# Patient Record
Sex: MALE | Race: WHITE | NOT HISPANIC OR LATINO | Employment: FULL TIME | URBAN - METROPOLITAN AREA
[De-identification: names, ages, dates, MRNs, and addresses within clinical notes are randomized per-mention and may not be internally consistent; named-entity substitution may affect disease eponyms.]

---

## 2021-01-03 ENCOUNTER — NURSE TRIAGE (OUTPATIENT)
Dept: OTHER | Facility: OTHER | Age: 41
End: 2021-01-03

## 2021-01-03 ENCOUNTER — TELEPHONE (OUTPATIENT)
Dept: OTHER | Facility: OTHER | Age: 41
End: 2021-01-03

## 2021-01-03 NOTE — TELEPHONE ENCOUNTER
Regarding: Brianberg of breath and No taste    ----- Message from Charissa Cochran sent at 1/3/2021  4:26 PM EST -----  "My girlfriend tested positive   And I am having  Headache, no taste, really tired, Shortness of breat

## 2021-01-03 NOTE — TELEPHONE ENCOUNTER
Reason for Disposition   SEVERE or constant chest pain or pressure (Exception: mild central chest pain, present only when coughing)    Additional Information   [1] Symptoms of COVID-19 (e g , cough, fever, SOB, or others) AND [2] within 14 days of EXPOSURE (close contact) with diagnosed or suspected COVID-19 patient    Answer Assessment - Initial Assessment Questions  1  Were you within 6 feet or less, for up to 15 minutes or more with a person that has a confirmed COVID-19 test?      [place]  2  What was the date of your exposure? Live together  3  Are you experiencing any symptoms attributed to the virus?  (Assess for SOB, cough, fever, difficulty breathing)    Headache, loss of taste, fatigue, sore throat, Denies fever  Feeling slightly feverish  Pt states he is having pain in his chest and SOB  Pt has history of COPD but states that the SOB feels more significant now  4  HIGH RISK: Do you have any history heart or lung conditions, weakened immune system, diabetes, Asthma, CHF, HIV, COPD, Chemo, renal failure, sickle cell, etc?     COPD    Protocols used: CORONAVIRUS (COVID-19) DIAGNOSED OR SUSPECTED-ADULT-AH, CORONAVIRUS (COVID-19) EXPOSURE-ADULT-AH    Pt stated that he would try to find a ride to the [facility]  Could not give a specific time frame on when he could go

## 2022-05-01 ENCOUNTER — HOSPITAL ENCOUNTER (EMERGENCY)
Facility: HOSPITAL | Age: 42
Discharge: HOME | End: 2022-05-01
Attending: STUDENT IN AN ORGANIZED HEALTH CARE EDUCATION/TRAINING PROGRAM
Payer: OTHER MISCELLANEOUS

## 2022-05-01 ENCOUNTER — APPOINTMENT (EMERGENCY)
Dept: RADIOLOGY | Facility: HOSPITAL | Age: 42
End: 2022-05-01
Payer: OTHER MISCELLANEOUS

## 2022-05-01 VITALS
TEMPERATURE: 98.1 F | WEIGHT: 273.59 LBS | BODY MASS INDEX: 35.11 KG/M2 | DIASTOLIC BLOOD PRESSURE: 80 MMHG | RESPIRATION RATE: 18 BRPM | SYSTOLIC BLOOD PRESSURE: 139 MMHG | HEART RATE: 77 BPM | HEIGHT: 74 IN | OXYGEN SATURATION: 98 %

## 2022-05-01 DIAGNOSIS — S61.319A LACERATION OF NAIL BED OF FINGER, INITIAL ENCOUNTER: ICD-10-CM

## 2022-05-01 DIAGNOSIS — S61.309A AVULSION OF FINGERNAIL, INITIAL ENCOUNTER: ICD-10-CM

## 2022-05-01 DIAGNOSIS — S62.609B OPEN FRACTURE OF FINGER: ICD-10-CM

## 2022-05-01 DIAGNOSIS — S67.10XA CRUSHING INJURY OF FINGER, INITIAL ENCOUNTER: Primary | ICD-10-CM

## 2022-05-01 DIAGNOSIS — S61.316A LACERATION OF RIGHT LITTLE FINGER WITHOUT FOREIGN BODY WITH DAMAGE TO NAIL, INITIAL ENCOUNTER: ICD-10-CM

## 2022-05-01 PROCEDURE — 99285 EMERGENCY DEPT VISIT HI MDM: CPT

## 2022-05-01 PROCEDURE — 99283 EMERGENCY DEPT VISIT LOW MDM: CPT | Performed by: STUDENT IN AN ORGANIZED HEALTH CARE EDUCATION/TRAINING PROGRAM

## 2022-05-01 PROCEDURE — 73140 X-RAY EXAM OF FINGER(S): CPT

## 2022-05-01 PROCEDURE — 96365 THER/PROPH/DIAG IV INF INIT: CPT

## 2022-05-01 PROCEDURE — 12001 RPR S/N/AX/GEN/TRNK 2.5CM/<: CPT | Performed by: STUDENT IN AN ORGANIZED HEALTH CARE EDUCATION/TRAINING PROGRAM

## 2022-05-01 RX ORDER — CEPHALEXIN 500 MG/1
500 CAPSULE ORAL EVERY 6 HOURS SCHEDULED
Qty: 27 CAPSULE | Refills: 0 | Status: SHIPPED | OUTPATIENT
Start: 2022-05-01 | End: 2022-05-08

## 2022-05-01 RX ORDER — OXYCODONE HYDROCHLORIDE 5 MG/1
5 TABLET ORAL EVERY 6 HOURS PRN
Qty: 8 TABLET | Refills: 0 | Status: SHIPPED | OUTPATIENT
Start: 2022-05-01

## 2022-05-01 RX ORDER — CEFAZOLIN SODIUM 2 G/50ML
2000 SOLUTION INTRAVENOUS ONCE
Status: COMPLETED | OUTPATIENT
Start: 2022-05-01 | End: 2022-05-01

## 2022-05-01 RX ORDER — BACITRACIN, NEOMYCIN, POLYMYXIN B 400; 3.5; 5 [USP'U]/G; MG/G; [USP'U]/G
1 OINTMENT TOPICAL ONCE
Status: COMPLETED | OUTPATIENT
Start: 2022-05-01 | End: 2022-05-01

## 2022-05-01 RX ORDER — BUPIVACAINE HYDROCHLORIDE 2.5 MG/ML
15 INJECTION, SOLUTION EPIDURAL; INFILTRATION; INTRACAUDAL ONCE
Status: COMPLETED | OUTPATIENT
Start: 2022-05-01 | End: 2022-05-01

## 2022-05-01 RX ADMIN — BACITRACIN, NEOMYCIN, POLYMYXIN B 1 SMALL APPLICATION: 400; 3.5; 5 OINTMENT TOPICAL at 04:57

## 2022-05-01 RX ADMIN — BUPIVACAINE HYDROCHLORIDE 15 ML: 2.5 INJECTION, SOLUTION EPIDURAL; INFILTRATION; INTRACAUDAL at 00:59

## 2022-05-01 RX ADMIN — CEFAZOLIN SODIUM 2000 MG: 2 SOLUTION INTRAVENOUS at 03:18

## 2022-05-01 NOTE — ED PROVIDER NOTES
History  Chief Complaint   Patient presents with    Crush Injury     pinky finger right hand crushed between metal       History provided by:  Patient  Finger Laceration  Location:  Finger  Finger laceration location:  R little finger  Depth: Through underlying tissue  Quality: avulsion    Bleeding: controlled    Time since incident:  15 minutes  Laceration mechanism:  Blunt object  Pain details:     Quality:  Throbbing    Severity:  Severe    Timing:  Constant  Relieved by:  None tried  Ineffective treatments:  None tried  Tetanus status:  Up to date     46year old M  Presents to the ED after a crush injury to his right fifth digit  He was at work when his right fifth digit was crushed between two large pieces of metal  The injury occurred immediately prior to arrival  Bleeding controlled  Tetanus booster is UTD  The patient expresses severe pain in his right hand  Past Medical History:   Diagnosis Date    COPD (chronic obstructive pulmonary disease) ([facility] [address] )        History reviewed  No pertinent surgical history  History reviewed  No pertinent family history  I have reviewed and agree with the history as documented  E-Cigarette/Vaping     E-Cigarette/Vaping Substances     Social History     Tobacco Use    Smoking status: Heavy Tobacco Smoker     Packs/day: 1 00    Smokeless tobacco: Never Used   Substance Use Topics    Alcohol use: Yes    Drug use: Never       Review of Systems   Musculoskeletal: Positive for arthralgias and joint swelling  Skin: Positive for color change and wound  Hematological: Does not bruise/bleed easily  All other systems reviewed and are negative  Physical Exam  Physical Exam  Musculoskeletal:        Hands:       Comments: Right 5th digit nail avulsion with associated nail bed injury  The nail bed was macerated  There were also two lacerations (1 5 cm and 1 0 cm) along the radial and volar aspects of the fifth digit  The fifth digit was otherwise NVI  Vital Signs  ED Triage Vitals [05/01/22 0032]   Temperature Pulse Respirations Blood Pressure SpO2   98 1 °F (36 7 °C) 83 16 (!) 179/93 96 %      Temp Source Heart Rate Source Patient Position - Orthostatic VS BP Location FiO2 (%)   Temporal Monitor Sitting Right arm --      Pain Score       8         Vitals:    05/01/22 0032 05/01/22 0500   BP: (!) 179/93 139/80   Pulse: 83 77   Patient Position - Orthostatic VS: Sitting Sitting     ED Medications  Medications   bupivacaine (PF) (MARCAINE) 0 25 % injection 15 mL (15 mL Infiltration Given 5/1/22 0059)   ceFAZolin (ANCEF) IVPB (premix in dextrose) 2,000 mg 50 mL (0 mg Intravenous Stopped 5/1/22 5587)   neomycin-bacitracin-polymyxin b (NEOSPORIN) ointment 1 small application (1 small application Topical Given 5/1/22 7330)     Diagnostic Studies  Results Reviewed     None             XR finger fifth digit-pinkie RIGHT   ED Interpretation by Anthony Marrero DO (05/01 2212)   Fifth distal phalanx fractures             Procedures  Laceration repair    Date/Time: 5/1/2022 3:15 AM  Performed by: Anthony Marrero DO  Authorized by: Anthony Marrero DO   Consent: Verbal consent obtained  Consent given by: patient  Body area: upper extremity  Location details: right small finger  Tendon involvement: none  Nerve involvement: none  Anesthesia: digital block    Anesthesia:  Local Anesthetic: bupivacaine 0 5% without epinephrine    Sedation:  Patient sedated: no      Wound Dehiscence:  Superficial Wound Dehiscence: simple closure  Secondary closure or dehiscence: complex    Procedure Details:  Irrigation solution: saline  Irrigation method: syringe  Amount of cleaning: extensive  Debridement: none  Degree of undermining: none  Skin closure: Ethilon (three sutures using 6-0 gut were used to bring together the macerated parts of the nail bed, while 5 sutures were used to keep the replaced nail in place   An additional )  Number of sutures: 16  Technique: simple  Approximation: loose  Approximation difficulty: complex  Dressing: 4x4 sterile gauze and antibiotic ointment  Patient tolerance: patient tolerated the procedure well with no immediate complications  Comments: 2 sutures using 6-0 gut were used to bring together the macerated parts of the nail bed, while 5 sutures (5-0 ethilon) were used to keep the replaced nail in place  An additional 9 sutures were used to repair the lacerations along the radial and volar aspects of the fifth digit  Splint application    Date/Time: 5/1/2022 4:00 AM  Performed by: Madelyn Vargas DO  Authorized by: Madelyn Vargas DO   Universal Protocol:  Consent given by: patient  Patient understanding: patient states understanding of the procedure being performed  Site marked: the operative site was marked  Patient identity confirmed: verbally with patient      Pre-procedure details:     Sensation:  Normal    Skin color:  Pink  Procedure details:     Laterality:  Right    Location:  Finger    Finger:  R small finger    Splint type:  Finger splint, static  Post-procedure details:     Pain:  Unchanged    Sensation:  Normal    Skin color:  Pink    Patient tolerance of procedure: Tolerated well, no immediate complications      ED Course     SBIRT 20yo+      Most Recent Value   SBIRT (22 yo +)    In order to provide better care to our patients, we are screening all of our patients for alcohol and drug use  Would it be okay to ask you these screening questions? No Filed at: 05/01/2022 0031      MDM     72-year-old male  Presents to the emergency department after a crushing injury to his right 5th finger  Upon examination, the patient has an avulsed fingernail with a macerated nail bed along with a laceration along the radial aspect of the 5th digit and along the volar aspect  A digital block was performed followed by extensive irrigation of the wounds  XRs sig for a distal phalanx fractures   Due to severity of the injury, the case was discussed with ED physician (Dr Luan Weldon) at [facility] for possible transfer for orthopedic evaluation and treatment  Dr Luan Weldon  was in contact with senior orthopedic resident (Dr Eli Maharaj) who advised that the  nail bed and other lacerations can be repaired in the ED with urgent Ortho referral  The patient's tetanus booster is UTD  The patient was administered a dose of Ancef prior to suturing  See procedural note above for further details  The patient tolerated the repair and splinting well  The patient was prescribed a course Keflex and PRN Oxycodone  Motrin/Tylenol recommended for pain  The patient's name and  were sent to the Orthopedic resident to arrange prompt follow up  Return precautions were discussed with the patient  All questions were addressed  The patient was stable for discharge       Disposition  Final diagnoses:   Crushing injury of finger, initial encounter   Open fracture of finger   Avulsion of fingernail, initial encounter   Laceration of nail bed of finger, initial encounter   Laceration of right little finger without foreign body with damage to nail, initial encounter     Time reflects when diagnosis was documented in both MDM as applicable and the Disposition within this note     Time User Action Codes Description Comment    2022  4:36 AM Mallissa Manner Add [S67 [id number]] Crushing injury of finger, initial encounter     2022  4:37 AM Mallissa Manner Add [S62 [id number]] Open fracture of finger     2022  4:37 AM Mallissa Manner Add [S61 309A] Avulsion of fingernail, initial encounter     2022  4:37 AM Mallissa Manner Add [S61 319A] Laceration of nail bed of finger, initial encounter     2022  4:37 AM Mallissa Manner Add [S61 316A] Laceration of right little finger without foreign body with damage to nail, initial encounter       ED Disposition     ED Disposition Condition Date/Time Comment    Discharge Stable Sun May 1, 2022  4:36 AM Merle Jose discharge to home/self care             Follow-up Information    None         Discharge Medication List as of 5/1/2022  4:41 AM      START taking these medications    Details   cephalexin (KEFLEX) 500 mg capsule Take 1 capsule (500 mg total) by mouth every 6 (six) hours for 7 days, Starting Sun 5/1/2022, Until Sun 5/8/2022, Normal      oxyCODONE (Roxicodone) 5 immediate release tablet Take 1 tablet (5 mg total) by mouth every 6 (six) hours as needed for severe pain Max Daily Amount: 20 mg, Starting Sun 5/1/2022, Normal             No discharge procedures on file      PDMP Review     None          ED Provider  Electronically Signed by           Benna Dakins, DO  05/01/22 4459

## 2022-05-01 NOTE — DISCHARGE INSTRUCTIONS
You were evaluated in the emergency department after a crushing injury to your finger  You have a fracture along the finger tip  Multiple sutures were used to repair the nail bed and surrounding tissue  For pain, you can take Motrin 600 mg every 6 hours and Tylenol 1000 mg every 6 hours  You are also being provided with a course of oxycodone as needed for pain  An urgent referral to Hand surgery at [facility] was provided  Expect a phone call within the next few days to set up the appointment  Do not hesitate to be re-evaluated in the ED for any concerning signs or symptoms

## 2023-04-04 ENCOUNTER — OFFICE VISIT (OUTPATIENT)
Dept: URGENT CARE | Facility: CLINIC | Age: 43
End: 2023-04-04

## 2023-04-04 VITALS
TEMPERATURE: 98.4 F | RESPIRATION RATE: 18 BRPM | BODY MASS INDEX: 30.16 KG/M2 | OXYGEN SATURATION: 97 % | SYSTOLIC BLOOD PRESSURE: 142 MMHG | HEIGHT: 74 IN | DIASTOLIC BLOOD PRESSURE: 84 MMHG | HEART RATE: 96 BPM | WEIGHT: 235 LBS

## 2023-04-04 DIAGNOSIS — J22 LOWER RESPIRATORY INFECTION: Primary | ICD-10-CM

## 2023-04-04 DIAGNOSIS — J02.9 SORE THROAT: ICD-10-CM

## 2023-04-04 LAB
S PYO AG THROAT QL: NEGATIVE
SARS-COV-2 AG UPPER RESP QL IA: NEGATIVE
VALID CONTROL: NORMAL

## 2023-04-04 RX ORDER — LANSOPRAZOLE 30 MG/1
30 CAPSULE, DELAYED RELEASE ORAL DAILY
COMMUNITY
Start: 2023-02-23

## 2023-04-04 RX ORDER — AMOXICILLIN 500 MG/1
500 TABLET, FILM COATED ORAL 2 TIMES DAILY
Qty: 20 TABLET | Refills: 0 | Status: SHIPPED | OUTPATIENT
Start: 2023-04-04 | End: 2023-04-14

## 2023-04-04 RX ORDER — FLUTICASONE PROPIONATE 50 MCG
SPRAY, SUSPENSION (ML) NASAL
COMMUNITY
Start: 2023-02-02

## 2023-04-04 RX ORDER — ALBUTEROL SULFATE 90 UG/1
2 AEROSOL, METERED RESPIRATORY (INHALATION) EVERY 6 HOURS PRN
Qty: 8.5 G | Refills: 0 | Status: SHIPPED | OUTPATIENT
Start: 2023-04-04

## 2023-04-04 RX ORDER — BENZONATATE 200 MG/1
200 CAPSULE ORAL 3 TIMES DAILY PRN
Qty: 20 CAPSULE | Refills: 0 | Status: SHIPPED | OUTPATIENT
Start: 2023-04-04

## 2023-04-04 NOTE — PROGRESS NOTES
[facility]        NAME: Rubens Hayes is a 38 y o  male  : 1980    MRN: [id number]  DATE: 2023  TIME: 1:06 PM    Assessment and Plan   Lower respiratory infection [J22]  1  Lower respiratory infection  Poct Covid 19 Rapid Antigen Test    POCT rapid strepA    amoxicillin (AMOXIL) 500 MG tablet    benzonatate (TESSALON) 200 MG capsule    albuterol (ProAir HFA) 90 mcg/act inhaler      2  Sore throat          Discussed problem with patient and his wife  Rapid strep and COVID performed today were both negative  Prescribing amoxicillin for lower respiratory infection as well as Tessalon Perles for cough complaints as well as albuterol inhaler as needed for shortness of breath and wheezing symptoms  Should continue to push fluids and recommended Mucinex and Flonase to help with nasal congestion  Should follow-up with PCP if symptoms not improve and report to the ER symptoms worsen  Patient Instructions       Follow up with PCP in 3-5 days  Proceed to  ER if symptoms worsen  Chief Complaint     Chief Complaint   Patient presents with   • Cold Like Symptoms     C/o no appetite, nasal congestion, headaches, wheezing, fatigue cough with brown mucus  Onset of symptoms x4 days  History of Present Illness       43-year-old male presents with 4 days of sore throat, cough, nasal congestion  Patient's son tested positive for strep last week  Symptoms started gradually and denies any fevers or chills but does report fatigue  Appetite is decreased and has not been pushing fluids  Does report cough and wheezing but states that could be due to postnasal drip  Reports episodes of diarrhea as well as headaches  Review of Systems   Review of Systems   Constitutional: Positive for appetite change and fatigue  Negative for chills and fever (tylenol or ibuprofen)  HENT: Positive for congestion, postnasal drip, rhinorrhea and sore throat (6/10)   Negative for ear pain, sinus pressure and sinus pain  Eyes: Negative for photophobia and visual disturbance  Respiratory: Positive for cough and wheezing  Negative for shortness of breath and stridor  Cardiovascular: Negative for chest pain and palpitations  Gastrointestinal: Positive for diarrhea  Negative for abdominal pain, constipation, nausea and vomiting  Musculoskeletal: Negative for myalgias  Neurological: Positive for headaches  Negative for dizziness, syncope and light-headedness           Current Medications       Current Outpatient Medications:   •  albuterol (ProAir HFA) 90 mcg/act inhaler, Inhale 2 puffs every 6 (six) hours as needed for wheezing, Disp: 8 5 g, Rfl: 0  •  amoxicillin (AMOXIL) 500 MG tablet, Take 1 tablet (500 mg total) by mouth 2 (two) times a day for 10 days, Disp: 20 tablet, Rfl: 0  •  benzonatate (TESSALON) 200 MG capsule, Take 1 capsule (200 mg total) by mouth 3 (three) times a day as needed for cough, Disp: 20 capsule, Rfl: 0  •  fluticasone (FLONASE) 50 mcg/act nasal spray, SPRAY 2 SPRAYS INTO EACH NOSTRIL EVERY DAY, Disp: , Rfl:   •  lansoprazole (PREVACID) 30 mg capsule, Take 30 mg by mouth daily, Disp: , Rfl:   •  oxyCODONE (Roxicodone) 5 immediate release tablet, Take 1 tablet (5 mg total) by mouth every 6 (six) hours as needed for severe pain Max Daily Amount: 20 mg, Disp: 8 tablet, Rfl: 0    Current Allergies     Allergies as of 04/04/2023   • (No Known Allergies)            The following portions of the patient's history were reviewed and updated as appropriate: allergies, current medications, past family history, past medical history, past social history, past surgical history and problem list      Past Medical History:   Diagnosis Date   • COPD (chronic obstructive pulmonary disease) ([facility] [address] )        Past Surgical History:   Procedure Laterality Date   • FINGER SURGERY Right     Pinky       Family History   Problem Relation Age of Onset   • Heart disease Father          Medications have been "verified  Objective   /84   Pulse 96   Temp 98 4 °F (36 9 °C)   Resp 18   Ht 6' 2\" (1 88 m)   Wt 107 kg (235 lb)   SpO2 97%   BMI 30 17 kg/m²        Physical Exam     Physical Exam  Vitals and nursing note reviewed  Constitutional:       General: He is not in acute distress  Appearance: Normal appearance  He is normal weight  He is not ill-appearing, toxic-appearing or diaphoretic  Comments: Patient is sitting comfortably on exam table  Diaphoresis noted on exam   HENT:      Head: Normocephalic  Right Ear: Tympanic membrane, ear canal and external ear normal  There is no impacted cerumen  Left Ear: Tympanic membrane, ear canal and external ear normal  There is no impacted cerumen  Nose: Congestion present  Mouth/Throat:      Mouth: Mucous membranes are moist       Pharynx: Oropharynx is clear  No oropharyngeal exudate or posterior oropharyngeal erythema  Eyes:      General:         Right eye: No discharge  Left eye: No discharge  Extraocular Movements: Extraocular movements intact  Conjunctiva/sclera: Conjunctivae normal       Pupils: Pupils are equal, round, and reactive to light  Neck:      Vascular: No carotid bruit  Cardiovascular:      Rate and Rhythm: Normal rate and regular rhythm  Pulses: Normal pulses  Heart sounds: Normal heart sounds  No murmur heard  No friction rub  No gallop  Pulmonary:      Effort: No respiratory distress  Breath sounds: Normal breath sounds  No stridor  No wheezing, rhonchi or rales  Chest:      Chest wall: No tenderness  Musculoskeletal:         General: No swelling, tenderness or signs of injury  Normal range of motion  Cervical back: Normal range of motion and neck supple  No rigidity or tenderness  Lymphadenopathy:      Cervical: No cervical adenopathy  Skin:     General: Skin is warm and dry  Capillary Refill: Capillary refill takes less than 2 seconds        " Coloration: Skin is not jaundiced or pale  Findings: No erythema  Neurological:      General: No focal deficit present  Mental Status: He is alert and oriented to person, place, and time     Psychiatric:         Mood and Affect: Mood normal          Behavior: Behavior normal

## 2023-04-25 ENCOUNTER — APPOINTMENT (EMERGENCY)
Dept: CT IMAGING | Facility: HOSPITAL | Age: 43
End: 2023-04-25

## 2023-04-25 ENCOUNTER — HOSPITAL ENCOUNTER (EMERGENCY)
Facility: HOSPITAL | Age: 43
Discharge: HOME | End: 2023-04-25
Attending: EMERGENCY MEDICINE

## 2023-04-25 VITALS
DIASTOLIC BLOOD PRESSURE: 82 MMHG | WEIGHT: 246.91 LBS | SYSTOLIC BLOOD PRESSURE: 128 MMHG | RESPIRATION RATE: 17 BRPM | OXYGEN SATURATION: 96 % | TEMPERATURE: 98.1 F | HEART RATE: 91 BPM | BODY MASS INDEX: 31.7 KG/M2

## 2023-04-25 DIAGNOSIS — S09.90XA INJURY OF HEAD, INITIAL ENCOUNTER: ICD-10-CM

## 2023-04-25 DIAGNOSIS — S16.1XXA STRAIN OF NECK MUSCLE, INITIAL ENCOUNTER: ICD-10-CM
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
## 2023-04-25 LAB
ALBUMIN SERPL BCP-MCNC: 3.5 G/DL (ref 3.5–5)
ALP SERPL-CCNC: 79 U/L (ref 34–104)
ALT SERPL W P-5'-P-CCNC: 29 U/L (ref 7–52)
ANION GAP SERPL CALCULATED.3IONS-SCNC: 6 MMOL/L (ref 4–13)
AST SERPL W P-5'-P-CCNC: 27 U/L (ref 13–39)
BASOPHILS # BLD AUTO: 0.04 THOUSANDS/ΜL (ref 0–0.1)
BASOPHILS NFR BLD AUTO: 0 % (ref 0–1)
BILIRUB SERPL-MCNC: 0.67 MG/DL (ref 0.2–1)
BUN SERPL-MCNC: 8 MG/DL (ref 5–25)
CALCIUM SERPL-MCNC: 8.5 MG/DL (ref 8.4–10.2)
CHLORIDE SERPL-SCNC: 108 MMOL/L (ref 96–108)
CO2 SERPL-SCNC: 23 MMOL/L (ref 21–32)
CREAT SERPL-MCNC: 1.29 MG/DL (ref 0.6–1.3)
EOSINOPHIL # BLD AUTO: 0.26 THOUSAND/ΜL (ref 0–0.61)
EOSINOPHIL NFR BLD AUTO: 3 % (ref 0–6)
ERYTHROCYTE [DISTWIDTH] IN BLOOD BY AUTOMATED COUNT: 16 % (ref 11.6–15.1)
GFR SERPL CREATININE-BSD FRML MDRD: 68 ML/MIN/1.73SQ M
GLUCOSE SERPL-MCNC: 96 MG/DL (ref 65–140)
HCT VFR BLD AUTO: 50.1 % (ref 36.5–49.3)
HGB BLD-MCNC: 16.5 G/DL (ref 12–17)
IMM GRANULOCYTES # BLD AUTO: 0.02 THOUSAND/UL (ref 0–0.2)
IMM GRANULOCYTES NFR BLD AUTO: 0 % (ref 0–2)
LYMPHOCYTES # BLD AUTO: 1.44 THOUSANDS/ΜL (ref 0.6–4.47)
LYMPHOCYTES NFR BLD AUTO: 14 % (ref 14–44)
MCH RBC QN AUTO: 30.1 PG (ref 26.8–34.3)
MCHC RBC AUTO-ENTMCNC: 32.9 G/DL (ref 31.4–37.4)
MCV RBC AUTO: 91 FL (ref 82–98)
MONOCYTES # BLD AUTO: 0.72 THOUSAND/ΜL (ref 0.17–1.22)
MONOCYTES NFR BLD AUTO: 7 % (ref 4–12)
NEUTROPHILS # BLD AUTO: 7.58 THOUSANDS/ΜL (ref 1.85–7.62)
NEUTS SEG NFR BLD AUTO: 76 % (ref 43–75)
NRBC BLD AUTO-RTO: 0 /100 WBCS
PLATELET # BLD AUTO: 312 THOUSANDS/UL (ref 149–390)
PMV BLD AUTO: 10.6 FL (ref 8.9–12.7)
POTASSIUM SERPL-SCNC: 4 MMOL/L (ref 3.5–5.3)
PROT SERPL-MCNC: 6.3 G/DL (ref 6.4–8.4)
RBC # BLD AUTO: 5.49 MILLION/UL (ref 3.88–5.62)
SODIUM SERPL-SCNC: 137 MMOL/L (ref 135–147)
WBC # BLD AUTO: 10.06 THOUSAND/UL (ref 4.31–10.16)

## 2023-04-25 RX ORDER — NAPROXEN 500 MG/1
500 TABLET ORAL 2 TIMES DAILY PRN
Qty: 20 TABLET | Refills: 0 | Status: SHIPPED | OUTPATIENT
Start: 2023-04-25

## 2023-04-25 RX ORDER — OXYCODONE HYDROCHLORIDE AND ACETAMINOPHEN 5; 325 MG/1; MG/1
1 TABLET ORAL ONCE
Status: COMPLETED | OUTPATIENT
Start: 2023-04-25 | End: 2023-04-25

## 2023-04-25 RX ORDER — CLONIDINE HYDROCHLORIDE 0.1 MG/1
0.1 TABLET ORAL ONCE
Status: COMPLETED | OUTPATIENT
Start: 2023-04-25 | End: 2023-04-25

## 2023-04-25 RX ORDER — METHOCARBAMOL 500 MG/1
500 TABLET, FILM COATED ORAL 2 TIMES DAILY PRN
Qty: 20 TABLET | Refills: 0 | Status: SHIPPED | OUTPATIENT
Start: 2023-04-25

## 2023-04-25 RX ADMIN — CLONIDINE HYDROCHLORIDE 0.1 MG: 0.1 TABLET ORAL at 09:50

## 2023-04-25 RX ADMIN — OXYCODONE HYDROCHLORIDE AND ACETAMINOPHEN 1 TABLET: 5; 325 TABLET ORAL at 09:50

## 2023-04-25 NOTE — Clinical Note
Rigoberto Fire was seen and treated in our emergency department on 4/25/2023  Diagnosis:     Jeana Srivastava  may return to work on return date  He may return on this date: 04/27/2023         If you have any questions or concerns, please don't hesitate to call        Janet Sanchez MD    ______________________________           _______________          _______________  Hospital Representative                              Date                                Time
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
## 2023-05-30 ENCOUNTER — HOSPITAL ENCOUNTER (OUTPATIENT)
Dept: MRI IMAGING | Facility: HOSPITAL | Age: 43
Discharge: HOME | End: 2023-05-30

## 2023-05-30 DIAGNOSIS — S06.9X0S UNSPECIFIED INTRACRANIAL INJURY WITHOUT LOSS OF CONSCIOUSNESS, SEQUELA (HCC): ICD-10-CM

## 2023-05-30 DIAGNOSIS — R20.2 PARESTHESIA OF SKIN: ICD-10-CM

## 2023-07-05 ENCOUNTER — OFFICE VISIT (OUTPATIENT)
Dept: URGENT CARE | Facility: CLINIC | Age: 43
End: 2023-07-05
Payer: COMMERCIAL

## 2023-07-05 ENCOUNTER — HOSPITAL ENCOUNTER (OUTPATIENT)
Dept: RADIOLOGY | Facility: CLINIC | Age: 43
Discharge: HOME | End: 2023-07-05
Payer: COMMERCIAL

## 2023-07-05 VITALS
DIASTOLIC BLOOD PRESSURE: 88 MMHG | RESPIRATION RATE: 18 BRPM | HEART RATE: 96 BPM | BODY MASS INDEX: 28.25 KG/M2 | WEIGHT: 220 LBS | TEMPERATURE: 97.3 F | OXYGEN SATURATION: 99 % | SYSTOLIC BLOOD PRESSURE: 126 MMHG

## 2023-07-05 DIAGNOSIS — S69.91XA INJURY OF RIGHT WRIST, INITIAL ENCOUNTER: ICD-10-CM

## 2023-07-05 DIAGNOSIS — S63.501A SPRAIN OF RIGHT WRIST, INITIAL ENCOUNTER: Primary | ICD-10-CM

## 2023-07-05 PROCEDURE — 99213 OFFICE O/P EST LOW 20 MIN: CPT | Performed by: PHYSICIAN ASSISTANT

## 2023-07-05 PROCEDURE — 29125 APPL SHORT ARM SPLINT STATIC: CPT | Performed by: PHYSICIAN ASSISTANT

## 2023-07-05 PROCEDURE — 73110 X-RAY EXAM OF WRIST: CPT

## 2023-07-05 NOTE — LETTER
July 5, 2023     Patient: Maryjo Branham   YOB: 1980   Date of Visit: 7/5/2023       To Whom It May Concern:    Please excuse Maryjo Branham from work until 7/10/2023 provided symptoms have improved. He may return sooner if symptoms resolve. If you have any questions or concerns, please don't hesitate to call.          Sincerely,        Kisha Mcdonough PA-C    CC: No Recipients

## 2023-07-05 NOTE — PATIENT INSTRUCTIONS
Tylenol/Ibuprofen for pain  Wear brace for support (Remove braces every 3 hours)  Ice 20 minutes 3-4 times per day for 3 days  Insulate the skin from the ice to prevent frostbite  Rest and Elevate  Follow up with orthopedic if symptoms do not improve  Follow up with PCP in 3-5 days. Proceed to  ER if symptoms worsen. Remove splint/brace and go straight to ER if you experience sudden increase in pain, swelling, change in color/temperature/sensation, chest pain, shortness or breath, or if you start coughing up blood.

## 2023-07-05 NOTE — PROGRESS NOTES
[facility]        NAME: Lita Christina is a 38 y.o. male  : 1980    MRN: [id number]  DATE: 2023  TIME: 4:04 PM    Assessment and Plan   Sprain of right wrist, initial encounter [S63.501A]  1. Sprain of right wrist, initial encounter  XR wrist 3+ vw right    Ambulatory referral to Orthopedic Surgery    Orthopedic injury treatment        XR provider read: no acute fracture or dislocation. Patient declined ortho apt but states he would like the phone number. Patient Instructions     Tylenol/Ibuprofen for pain  Wear brace for support (Remove braces every 3 hours)  Ice 20 minutes 3-4 times per day for 3 days  Insulate the skin from the ice to prevent frostbite  Rest and Elevate  Follow up with orthopedic if symptoms do not improve  Follow up with PCP in 3-5 days. Proceed to  ER if symptoms worsen. Remove splint/brace and go straight to ER if you experience sudden increase in pain, swelling, change in color/temperature/sensation, chest pain, shortness or breath, or if you start coughing up blood. Chief Complaint     Chief Complaint   Patient presents with   • Wrist Pain     C/o right wrist pain after pt felt a "snap" while playing with his kids 2 days ago         History of Present Illness       Patient states he had a bone graft in the same radial area years ago. Wrist Pain   Pain location: R wrist. This is a new problem. Episode onset: 2 days. History of extremity trauma: lifting son onto second bunk when he twisted his R wrist and felt a pop. The problem has been gradually worsening. The quality of the pain is described as aching. The pain is moderate. Pertinent negatives include no limited range of motion, numbness or tingling. He has tried acetaminophen and NSAIDS (icey/hot) for the symptoms. Review of Systems   Review of Systems   Musculoskeletal: Negative for joint swelling. Skin: Negative for color change. Neurological: Positive for weakness.  Negative for tingling and numbness. Current Medications       Current Outpatient Medications:   •  fluticasone (FLONASE) 50 mcg/act nasal spray, SPRAY 2 SPRAYS INTO EACH NOSTRIL EVERY DAY, Disp: , Rfl:   •  lansoprazole (PREVACID) 30 mg capsule, Take 30 mg by mouth daily, Disp: , Rfl:   •  albuterol (ProAir HFA) 90 mcg/act inhaler, Inhale 2 puffs every 6 (six) hours as needed for wheezing (Patient not taking: Reported on 7/5/2023), Disp: 8.5 g, Rfl: 0  •  benzonatate (TESSALON) 200 MG capsule, Take 1 capsule (200 mg total) by mouth 3 (three) times a day as needed for cough (Patient not taking: Reported on 4/25/2023), Disp: 20 capsule, Rfl: 0  •  methocarbamol (ROBAXIN) 500 mg tablet, Take 1 tablet (500 mg total) by mouth 2 (two) times a day as needed for muscle spasms, Disp: 20 tablet, Rfl: 0  •  naproxen (Naprosyn) 500 mg tablet, Take 1 tablet (500 mg total) by mouth 2 (two) times a day as needed for moderate pain for up to 20 doses, Disp: 20 tablet, Rfl: 0  •  oxyCODONE (Roxicodone) 5 immediate release tablet, Take 1 tablet (5 mg total) by mouth every 6 (six) hours as needed for severe pain Max Daily Amount: 20 mg, Disp: 8 tablet, Rfl: 0    Current Allergies     Allergies as of 07/05/2023   • (No Known Allergies)            The following portions of the patient's history were reviewed and updated as appropriate: allergies, current medications, past family history, past medical history, past social history, past surgical history and problem list.     Past Medical History:   Diagnosis Date   • COPD (chronic obstructive pulmonary disease) ([address])        Past Surgical History:   Procedure Laterality Date   • FINGER SURGERY Right     Pinky       Family History   Problem Relation Age of Onset   • Heart disease Father          Medications have been verified. Objective   /88   Pulse 96   Temp (!) 97.3 °F (36.3 °C)   Resp 18   Wt 99.8 kg (220 lb)   SpO2 99%   BMI 28.25 kg/m²   No LMP for male patient. Physical Exam     Physical Exam  Vitals reviewed. Constitutional:       General: He is not in acute distress. Appearance: He is well-developed. HENT:      Head: Normocephalic and atraumatic. Cardiovascular:      Rate and Rhythm: Normal rate and regular rhythm. Pulses: Normal pulses. Heart sounds: Normal heart sounds. No murmur heard. No friction rub. No gallop. Pulmonary:      Effort: Pulmonary effort is normal. No respiratory distress. Breath sounds: Normal breath sounds. No wheezing, rhonchi or rales. Musculoskeletal:         General: Tenderness (R distal radius and ulna) present. No swelling or deformity. Normal range of motion. Comments: Pain with R wrist flexion, extension, ulnar and radial deviation   Skin:     General: Skin is warm. Capillary Refill: Capillary refill takes less than 2 seconds. Findings: No bruising or erythema. Neurological:      General: No focal deficit present. Mental Status: He is alert and oriented to person, place, and time. Sensory: No sensory deficit. Psychiatric:         Behavior: Behavior normal.         Thought Content: Thought content normal.         Judgment: Judgment normal.       Orthopedic injury treatment    Date/Time: 7/5/2023 3:30 PM    Performed by: Regina Rowley PA-C  Authorized by: Regina Rowley PA-C  Universal Protocol:  Consent: Verbal consent obtained.   Risks and benefits: risks, benefits and alternatives were discussed  Consent given by: patient  Patient identity confirmed: verbally with patient      Injury location: R wrist.  Neurovascular status: Neurovascularly intact    Distal perfusion: normal    Neurological function: normal    Range of motion: normal    Immobilization: static wrist brace (DME)  Neurovascular status: Neurovascularly intact    Distal perfusion: normal    Neurological function: normal    Range of motion: unchanged    Patient tolerance:  Patient tolerated the procedure well with no immediate complications

## 2023-07-06 ENCOUNTER — TELEPHONE (OUTPATIENT)
Dept: OBGYN CLINIC | Facility: OTHER | Age: 43
End: 2023-07-06

## 2023-07-06 NOTE — TELEPHONE ENCOUNTER
Patient is being referred to a orthopedics. Please schedule accordingly.     [address] [facility]   [phone]

## 2024-06-04 ENCOUNTER — APPOINTMENT (EMERGENCY)
Dept: RADIOLOGY | Facility: HOSPITAL | Age: 44
End: 2024-06-04
Payer: COMMERCIAL

## 2024-06-04 ENCOUNTER — HOSPITAL ENCOUNTER (OUTPATIENT)
Facility: HOSPITAL | Age: 44
Setting detail: OBSERVATION
Discharge: HOME | End: 2024-06-05
Attending: EMERGENCY MEDICINE | Admitting: FAMILY MEDICINE
Payer: COMMERCIAL

## 2024-06-04 DIAGNOSIS — I31.9 PERICARDITIS: ICD-10-CM

## 2024-06-04 DIAGNOSIS — R94.31 ABNORMAL EKG: ICD-10-CM

## 2024-06-04 DIAGNOSIS — N17.9 AKI (ACUTE KIDNEY INJURY) (HCC): ICD-10-CM

## 2024-06-04 DIAGNOSIS — R07.9 CHEST PAIN: Primary | ICD-10-CM

## 2024-06-04 DIAGNOSIS — I10 HTN (HYPERTENSION): ICD-10-CM

## 2024-06-04 PROBLEM — J30.2 SEASONAL ALLERGIES: Status: ACTIVE | Noted: 2024-06-04

## 2024-06-04 PROBLEM — K21.9 GASTROESOPHAGEAL REFLUX DISEASE WITHOUT ESOPHAGITIS: Status: ACTIVE | Noted: 2024-06-04

## 2024-06-04 PROBLEM — R07.89 OTHER CHEST PAIN: Status: ACTIVE | Noted: 2024-06-04

## 2024-06-04 PROBLEM — R20.0 LEFT LEG NUMBNESS: Status: ACTIVE | Noted: 2024-06-04

## 2024-06-04 PROBLEM — F17.200 TOBACCO DEPENDENCE: Status: ACTIVE | Noted: 2024-06-04

## 2024-06-04 LAB
ALBUMIN SERPL BCP-MCNC: 3.7 G/DL (ref 3.5–5)
ALP SERPL-CCNC: 84 U/L (ref 34–104)
ALT SERPL W P-5'-P-CCNC: 23 U/L (ref 7–52)
ANION GAP SERPL CALCULATED.3IONS-SCNC: 6 MMOL/L (ref 4–13)
APTT PPP: 27 SECONDS (ref 23–37)
AST SERPL W P-5'-P-CCNC: 25 U/L (ref 13–39)
BASOPHILS # BLD AUTO: 0.06 THOUSANDS/ÂΜL (ref 0–0.1)
BASOPHILS NFR BLD AUTO: 1 % (ref 0–1)
BILIRUB SERPL-MCNC: 0.56 MG/DL (ref 0.2–1)
BNP SERPL-MCNC: 17 PG/ML (ref 0–100)
BUN SERPL-MCNC: 9 MG/DL (ref 5–25)
CALCIUM SERPL-MCNC: 8.9 MG/DL (ref 8.4–10.2)
CARDIAC TROPONIN I PNL SERPL HS: 5 NG/L
CHLORIDE SERPL-SCNC: 107 MMOL/L (ref 96–108)
CO2 SERPL-SCNC: 27 MMOL/L (ref 21–32)
CREAT SERPL-MCNC: 1.49 MG/DL (ref 0.6–1.3)
D DIMER PPP FEU-MCNC: 0.49 UG/ML FEU
EOSINOPHIL # BLD AUTO: 0.18 THOUSAND/ÂΜL (ref 0–0.61)
EOSINOPHIL NFR BLD AUTO: 2 % (ref 0–6)
ERYTHROCYTE [DISTWIDTH] IN BLOOD BY AUTOMATED COUNT: 13.8 % (ref 11.6–15.1)
GFR SERPL CREATININE-BSD FRML MDRD: 57 ML/MIN/1.73SQ M
GLUCOSE SERPL-MCNC: 86 MG/DL (ref 65–140)
HCT VFR BLD AUTO: 50.6 % (ref 36.5–49.3)
HGB BLD-MCNC: 17 G/DL (ref 12–17)
IMM GRANULOCYTES # BLD AUTO: 0.05 THOUSAND/UL (ref 0–0.2)
IMM GRANULOCYTES NFR BLD AUTO: 1 % (ref 0–2)
INR PPP: 0.99 (ref 0.84–1.19)
LYMPHOCYTES # BLD AUTO: 1.39 THOUSANDS/ÂΜL (ref 0.6–4.47)
LYMPHOCYTES NFR BLD AUTO: 13 % (ref 14–44)
MCH RBC QN AUTO: 31.1 PG (ref 26.8–34.3)
MCHC RBC AUTO-ENTMCNC: 33.6 G/DL (ref 31.4–37.4)
MCV RBC AUTO: 93 FL (ref 82–98)
MONOCYTES # BLD AUTO: 0.63 THOUSAND/ÂΜL (ref 0.17–1.22)
MONOCYTES NFR BLD AUTO: 6 % (ref 4–12)
NEUTROPHILS # BLD AUTO: 8.12 THOUSANDS/ÂΜL (ref 1.85–7.62)
NEUTS SEG NFR BLD AUTO: 77 % (ref 43–75)
NRBC BLD AUTO-RTO: 0 /100 WBCS
PLATELET # BLD AUTO: 271 THOUSANDS/UL (ref 149–390)
PMV BLD AUTO: 10.8 FL (ref 8.9–12.7)
POTASSIUM SERPL-SCNC: 4.2 MMOL/L (ref 3.5–5.3)
PROT SERPL-MCNC: 6.4 G/DL (ref 6.4–8.4)
PROTHROMBIN TIME: 13.3 SECONDS (ref 11.6–14.5)
RBC # BLD AUTO: 5.47 MILLION/UL (ref 3.88–5.62)
SODIUM SERPL-SCNC: 140 MMOL/L (ref 135–147)
WBC # BLD AUTO: 10.43 THOUSAND/UL (ref 4.31–10.16)

## 2024-06-04 PROCEDURE — 99285 EMERGENCY DEPT VISIT HI MDM: CPT | Performed by: EMERGENCY MEDICINE

## 2024-06-04 PROCEDURE — 85025 COMPLETE CBC W/AUTO DIFF WBC: CPT | Performed by: EMERGENCY MEDICINE

## 2024-06-04 PROCEDURE — 83880 ASSAY OF NATRIURETIC PEPTIDE: CPT | Performed by: EMERGENCY MEDICINE

## 2024-06-04 PROCEDURE — 36415 COLL VENOUS BLD VENIPUNCTURE: CPT | Performed by: EMERGENCY MEDICINE

## 2024-06-04 PROCEDURE — 71045 X-RAY EXAM CHEST 1 VIEW: CPT

## 2024-06-04 PROCEDURE — 85379 FIBRIN DEGRADATION QUANT: CPT | Performed by: EMERGENCY MEDICINE

## 2024-06-04 PROCEDURE — 99223 1ST HOSP IP/OBS HIGH 75: CPT | Performed by: FAMILY MEDICINE

## 2024-06-04 PROCEDURE — 93005 ELECTROCARDIOGRAM TRACING: CPT

## 2024-06-04 PROCEDURE — 85610 PROTHROMBIN TIME: CPT | Performed by: EMERGENCY MEDICINE

## 2024-06-04 PROCEDURE — 99285 EMERGENCY DEPT VISIT HI MDM: CPT

## 2024-06-04 PROCEDURE — 96361 HYDRATE IV INFUSION ADD-ON: CPT

## 2024-06-04 PROCEDURE — 96375 TX/PRO/DX INJ NEW DRUG ADDON: CPT

## 2024-06-04 PROCEDURE — 80053 COMPREHEN METABOLIC PANEL: CPT | Performed by: EMERGENCY MEDICINE

## 2024-06-04 PROCEDURE — 84484 ASSAY OF TROPONIN QUANT: CPT | Performed by: EMERGENCY MEDICINE

## 2024-06-04 PROCEDURE — 85730 THROMBOPLASTIN TIME PARTIAL: CPT | Performed by: EMERGENCY MEDICINE

## 2024-06-04 PROCEDURE — 96374 THER/PROPH/DIAG INJ IV PUSH: CPT

## 2024-06-04 RX ORDER — NICOTINE 21 MG/24HR
1 PATCH, TRANSDERMAL 24 HOURS TRANSDERMAL DAILY
Status: DISCONTINUED | OUTPATIENT
Start: 2024-06-04 | End: 2024-06-05 | Stop reason: HOSPADM

## 2024-06-04 RX ORDER — HEPARIN SODIUM 1000 [USP'U]/ML
4000 INJECTION, SOLUTION INTRAVENOUS; SUBCUTANEOUS EVERY 6 HOURS PRN
Status: DISCONTINUED | OUTPATIENT
Start: 2024-06-04 | End: 2024-06-05

## 2024-06-04 RX ORDER — HEPARIN SODIUM 1000 [USP'U]/ML
2000 INJECTION, SOLUTION INTRAVENOUS; SUBCUTANEOUS EVERY 6 HOURS PRN
Status: DISCONTINUED | OUTPATIENT
Start: 2024-06-04 | End: 2024-06-05

## 2024-06-04 RX ORDER — ASPIRIN 81 MG/1
81 TABLET, CHEWABLE ORAL DAILY
Status: DISCONTINUED | OUTPATIENT
Start: 2024-06-05 | End: 2024-06-05

## 2024-06-04 RX ORDER — NITROGLYCERIN 0.4 MG/1
0.4 TABLET SUBLINGUAL ONCE
Status: COMPLETED | OUTPATIENT
Start: 2024-06-04 | End: 2024-06-04

## 2024-06-04 RX ORDER — MORPHINE SULFATE 4 MG/ML
4 INJECTION, SOLUTION INTRAMUSCULAR; INTRAVENOUS EVERY 4 HOURS PRN
Status: DISCONTINUED | OUTPATIENT
Start: 2024-06-04 | End: 2024-06-05 | Stop reason: HOSPADM

## 2024-06-04 RX ORDER — ASPIRIN 81 MG/1
324 TABLET, CHEWABLE ORAL ONCE
Status: COMPLETED | OUTPATIENT
Start: 2024-06-04 | End: 2024-06-04

## 2024-06-04 RX ORDER — NICOTINE 21 MG/24HR
1 PATCH, TRANSDERMAL 24 HOURS TRANSDERMAL DAILY
Status: DISCONTINUED | OUTPATIENT
Start: 2024-06-05 | End: 2024-06-04

## 2024-06-04 RX ORDER — ACETAMINOPHEN 325 MG/1
650 TABLET ORAL EVERY 6 HOURS PRN
Status: DISCONTINUED | OUTPATIENT
Start: 2024-06-04 | End: 2024-06-05 | Stop reason: HOSPADM

## 2024-06-04 RX ORDER — ONDANSETRON 2 MG/ML
4 INJECTION INTRAMUSCULAR; INTRAVENOUS EVERY 6 HOURS PRN
Status: DISCONTINUED | OUTPATIENT
Start: 2024-06-04 | End: 2024-06-05 | Stop reason: HOSPADM

## 2024-06-04 RX ORDER — HEPARIN SODIUM 1000 [USP'U]/ML
4000 INJECTION, SOLUTION INTRAVENOUS; SUBCUTANEOUS ONCE
Status: COMPLETED | OUTPATIENT
Start: 2024-06-04 | End: 2024-06-04

## 2024-06-04 RX ORDER — PANTOPRAZOLE SODIUM 40 MG/1
40 TABLET, DELAYED RELEASE ORAL
Status: DISCONTINUED | OUTPATIENT
Start: 2024-06-05 | End: 2024-06-05 | Stop reason: HOSPADM

## 2024-06-04 RX ORDER — HEPARIN SODIUM 10000 [USP'U]/100ML
3-20 INJECTION, SOLUTION INTRAVENOUS
Status: DISCONTINUED | OUTPATIENT
Start: 2024-06-04 | End: 2024-06-05

## 2024-06-04 RX ADMIN — NITROGLYCERIN 0.4 MG: 0.4 TABLET SUBLINGUAL at 21:35

## 2024-06-04 RX ADMIN — HEPARIN SODIUM 4000 UNITS: 1000 INJECTION INTRAVENOUS; SUBCUTANEOUS at 22:25

## 2024-06-04 RX ADMIN — SODIUM CHLORIDE 1000 ML: 0.9 INJECTION, SOLUTION INTRAVENOUS at 21:32

## 2024-06-04 RX ADMIN — METOPROLOL TARTRATE 25 MG: 25 TABLET, FILM COATED ORAL at 22:25

## 2024-06-04 RX ADMIN — ASPIRIN 81 MG 324 MG: 81 TABLET ORAL at 21:35

## 2024-06-04 RX ADMIN — HEPARIN SODIUM 11.1 UNITS/KG/HR: 10000 INJECTION, SOLUTION INTRAVENOUS at 22:24

## 2024-06-04 NOTE — LETTER
[facility]  [address] [facility] [address]  Dept: [phone]    June 5, 2024     Patient: Dylan Otero   YOB: 1980   Date of Visit: 6/4/2024       To Whom it May Concern:    Dylan Otero is under my professional care. He was seen in the hospital from 6/4/2024 to 06/05/24. He may return to work on 6/6/24 without limitations.    If you have any questions or concerns, please don't hesitate to call.         Sincerely,          Carolann Velasquez MD

## 2024-06-05 ENCOUNTER — APPOINTMENT (OUTPATIENT)
Dept: NON INVASIVE DIAGNOSTICS | Facility: HOSPITAL | Age: 44
End: 2024-06-05
Payer: COMMERCIAL

## 2024-06-05 VITALS
OXYGEN SATURATION: 96 % | WEIGHT: 246.91 LBS | SYSTOLIC BLOOD PRESSURE: 139 MMHG | HEIGHT: 74 IN | BODY MASS INDEX: 31.69 KG/M2 | DIASTOLIC BLOOD PRESSURE: 89 MMHG | HEART RATE: 85 BPM | RESPIRATION RATE: 18 BRPM | TEMPERATURE: 97.7 F

## 2024-06-05 LAB
25(OH)D3 SERPL-MCNC: 8.7 NG/ML (ref 30–100)
2HR DELTA HS TROPONIN: 0 NG/L
4HR DELTA HS TROPONIN: 0 NG/L
AORTIC ROOT: 3.9 CM
AORTIC VALVE MEAN VELOCITY: 8.4 M/S
APICAL FOUR CHAMBER EJECTION FRACTION: 71 %
APTT PPP: 30 SECONDS (ref 23–37)
ASCENDING AORTA: 2.8 CM
ATRIAL RATE: 66 BPM
ATRIAL RATE: 76 BPM
ATRIAL RATE: 97 BPM
AV LVOT MEAN GRADIENT: 1 MMHG
AV LVOT PEAK GRADIENT: 3 MMHG
AV MEAN GRADIENT: 3 MMHG
AV PEAK GRADIENT: 6 MMHG
AV VELOCITY RATIO: 0.67
BSA FOR ECHO PROCEDURE: 2.38 M2
CARDIAC TROPONIN I PNL SERPL HS: 5 NG/L
CARDIAC TROPONIN I PNL SERPL HS: 5 NG/L
CHOLEST SERPL-MCNC: 134 MG/DL
DOP CALC AO PEAK VEL: 1.24 M/S
DOP CALC AO VTI: 25.95 CM
DOP CALC LVOT PEAK VEL VTI: 20.01 CM
DOP CALC LVOT PEAK VEL: 0.83 M/S
E WAVE DECELERATION TIME: 194 MS
E/A RATIO: 1.02
FRACTIONAL SHORTENING: 39 (ref 28–44)
HDLC SERPL-MCNC: 31 MG/DL
INTERVENTRICULAR SEPTUM IN DIASTOLE (PARASTERNAL SHORT AXIS VIEW): 1.2 CM
INTERVENTRICULAR SEPTUM: 1.2 CM (ref 0.6–1.1)
LAAS-AP2: 16.5 CM2
LAAS-AP4: 20.4 CM2
LDLC SERPL CALC-MCNC: 80 MG/DL (ref 0–100)
LEFT ATRIUM SIZE: 4 CM
LEFT ATRIUM VOLUME (MOD BIPLANE): 56 ML
LEFT ATRIUM VOLUME INDEX (MOD BIPLANE): 23.5 ML/M2
LEFT INTERNAL DIMENSION IN SYSTOLE: 3.5 CM (ref 2.1–4)
LEFT VENTRICLE DIASTOLIC VOLUME (MOD BIPLANE): 150 ML
LEFT VENTRICLE DIASTOLIC VOLUME INDEX (MOD BIPLANE): 63 ML/M2
LEFT VENTRICLE SYSTOLIC VOLUME (MOD BIPLANE): 48 ML
LEFT VENTRICLE SYSTOLIC VOLUME INDEX (MOD BIPLANE): 20.2 ML/M2
LEFT VENTRICULAR INTERNAL DIMENSION IN DIASTOLE: 5.7 CM (ref 3.5–6)
LEFT VENTRICULAR POSTERIOR WALL IN END DIASTOLE: 1.5 CM
LEFT VENTRICULAR STROKE VOLUME: 109 ML
LV EF: 68 %
LVSV (TEICH): 109 ML
MV E'TISSUE VEL-LAT: 10 CM/S
MV E'TISSUE VEL-SEP: 8 CM/S
MV PEAK A VEL: 0.6 M/S
MV PEAK E VEL: 61 CM/S
MV STENOSIS PRESSURE HALF TIME: 56 MS
MV VALVE AREA P 1/2 METHOD: 3.93
NONHDLC SERPL-MCNC: 103 MG/DL
P AXIS: 33 DEGREES
P AXIS: 48 DEGREES
P AXIS: 53 DEGREES
PR INTERVAL: 138 MS
PR INTERVAL: 148 MS
PR INTERVAL: 152 MS
QRS AXIS: 41 DEGREES
QRS AXIS: 45 DEGREES
QRS AXIS: 55 DEGREES
QRSD INTERVAL: 106 MS
QRSD INTERVAL: 106 MS
QRSD INTERVAL: 110 MS
QT INTERVAL: 324 MS
QT INTERVAL: 366 MS
QT INTERVAL: 382 MS
QTC INTERVAL: 400 MS
QTC INTERVAL: 411 MS
QTC INTERVAL: 411 MS
RIGHT ATRIUM AREA SYSTOLE A4C: 21.6 CM2
RIGHT VENTRICLE ID DIMENSION: 3.6 CM
SL CV LEFT ATRIUM LENGTH A2C: 4.8 CM
SL CV PED ECHO LEFT VENTRICLE DIASTOLIC VOLUME (MOD BIPLANE) 2D: 161 ML
SL CV PED ECHO LEFT VENTRICLE SYSTOLIC VOLUME (MOD BIPLANE) 2D: 52 ML
T WAVE AXIS: 41 DEGREES
T WAVE AXIS: 47 DEGREES
T WAVE AXIS: 48 DEGREES
TRICUSPID ANNULAR PLANE SYSTOLIC EXCURSION: 2.8 CM
TRIGL SERPL-MCNC: 117 MG/DL
VENTRICULAR RATE: 66 BPM
VENTRICULAR RATE: 76 BPM
VENTRICULAR RATE: 97 BPM
VIT B12 SERPL-MCNC: 407 PG/ML (ref 180–914)

## 2024-06-05 PROCEDURE — 84484 ASSAY OF TROPONIN QUANT: CPT | Performed by: FAMILY MEDICINE

## 2024-06-05 PROCEDURE — 85730 THROMBOPLASTIN TIME PARTIAL: CPT | Performed by: FAMILY MEDICINE

## 2024-06-05 PROCEDURE — 99239 HOSP IP/OBS DSCHRG MGMT >30: CPT | Performed by: INTERNAL MEDICINE

## 2024-06-05 PROCEDURE — 82607 VITAMIN B-12: CPT | Performed by: FAMILY MEDICINE

## 2024-06-05 PROCEDURE — 80061 LIPID PANEL: CPT | Performed by: NURSE PRACTITIONER

## 2024-06-05 PROCEDURE — 93005 ELECTROCARDIOGRAM TRACING: CPT

## 2024-06-05 PROCEDURE — 93306 TTE W/DOPPLER COMPLETE: CPT

## 2024-06-05 PROCEDURE — 82306 VITAMIN D 25 HYDROXY: CPT | Performed by: FAMILY MEDICINE

## 2024-06-05 PROCEDURE — 93010 ELECTROCARDIOGRAM REPORT: CPT | Performed by: INTERNAL MEDICINE

## 2024-06-05 PROCEDURE — 99204 OFFICE O/P NEW MOD 45 MIN: CPT | Performed by: INTERNAL MEDICINE

## 2024-06-05 PROCEDURE — 93306 TTE W/DOPPLER COMPLETE: CPT | Performed by: INTERNAL MEDICINE

## 2024-06-05 RX ORDER — NICOTINE 21 MG/24HR
1 PATCH, TRANSDERMAL 24 HOURS TRANSDERMAL DAILY
Qty: 28 PATCH | Refills: 0 | Status: SHIPPED | OUTPATIENT
Start: 2024-06-06

## 2024-06-05 RX ORDER — LANOLIN ALCOHOL/MO/W.PET/CERES
3 CREAM (GRAM) TOPICAL
Status: DISCONTINUED | OUTPATIENT
Start: 2024-06-05 | End: 2024-06-05 | Stop reason: HOSPADM

## 2024-06-05 RX ORDER — ATORVASTATIN CALCIUM 20 MG/1
20 TABLET, FILM COATED ORAL
Status: DISCONTINUED | OUTPATIENT
Start: 2024-06-05 | End: 2024-06-05 | Stop reason: HOSPADM

## 2024-06-05 RX ORDER — ATORVASTATIN CALCIUM 20 MG/1
20 TABLET, FILM COATED ORAL
Qty: 30 TABLET | Refills: 0 | Status: SHIPPED | OUTPATIENT
Start: 2024-06-05

## 2024-06-05 RX ADMIN — NICOTINE 1 PATCH: 21 PATCH, EXTENDED RELEASE TRANSDERMAL at 09:17

## 2024-06-05 RX ADMIN — PANTOPRAZOLE SODIUM 40 MG: 40 TABLET, DELAYED RELEASE ORAL at 05:09

## 2024-06-05 RX ADMIN — HEPARIN SODIUM 4000 UNITS: 1000 INJECTION INTRAVENOUS; SUBCUTANEOUS at 05:38

## 2024-06-05 RX ADMIN — ASPIRIN 81 MG: 81 TABLET, COATED ORAL at 09:17

## 2024-06-05 RX ADMIN — METOPROLOL TARTRATE 25 MG: 25 TABLET, FILM COATED ORAL at 09:17

## 2024-06-05 RX ADMIN — Medication 3 MG: at 01:59

## 2024-06-05 NOTE — PLAN OF CARE
Problem: PAIN - ADULT  Goal: Verbalizes/displays adequate comfort level or baseline comfort level  Description: Interventions:  - Encourage patient to monitor pain and request assistance  - Assess pain using appropriate pain scale  - Administer analgesics based on type and severity of pain and evaluate response  - Implement non-pharmacological measures as appropriate and evaluate response  - Consider cultural and social influences on pain and pain management  - Notify physician/advanced practitioner if interventions unsuccessful or patient reports new pain  Outcome: Progressing     Problem: INFECTION - ADULT  Goal: Absence or prevention of progression during hospitalization  Description: INTERVENTIONS:  - Assess and monitor for signs and symptoms of infection  - Monitor lab/diagnostic results  - Monitor all insertion sites, i.e. indwelling lines, tubes, and drains  - Monitor endotracheal if appropriate and nasal secretions for changes in amount and color  - Faith appropriate cooling/warming therapies per order  - Administer medications as ordered  - Instruct and encourage patient and family to use good hand hygiene technique  - Identify and instruct in appropriate isolation precautions for identified infection/condition  Outcome: Progressing  Goal: Absence of fever/infection during neutropenic period  Description: INTERVENTIONS:  - Monitor WBC    Outcome: Progressing     Problem: SAFETY ADULT  Goal: Patient will remain free of falls  Description: INTERVENTIONS:  - Educate patient/family on patient safety including physical limitations  - Instruct patient to call for assistance with activity   - Consult OT/PT to assist with strengthening/mobility   - Keep Call bell within reach  - Keep bed low and locked with side rails adjusted as appropriate  - Keep care items and personal belongings within reach  - Initiate and maintain comfort rounds  - Make Fall Risk Sign visible to staff  - Apply yellow socks and bracelet  for high fall risk patients  - Consider moving patient to room near nurses station  Outcome: Progressing  Goal: Maintain or return to baseline ADL function  Description: INTERVENTIONS:  -  Assess patient's ability to carry out ADLs; assess patient's baseline for ADL function and identify physical deficits which impact ability to perform ADLs (bathing, care of mouth/teeth, toileting, grooming, dressing, etc.)  - Assess/evaluate cause of self-care deficits   - Assess range of motion  - Assess patient's mobility; develop plan if impaired  - Assess patient's need for assistive devices and provide as appropriate  - Encourage maximum independence but intervene and supervise when necessary  - Involve family in performance of ADLs  - Assess for home care needs following discharge   - Consider OT consult to assist with ADL evaluation and planning for discharge  - Provide patient education as appropriate  Outcome: Progressing  Goal: Maintains/Returns to pre admission functional level  Description: INTERVENTIONS:  - Perform AM-PAC 6 Click Basic Mobility/ Daily Activity assessment daily.  - Set and communicate daily mobility goal to care team and patient/family/caregiver.   - Collaborate with rehabilitation services on mobility goals if consulted  - Perform Range of Motion 3 times a day.  - Reposition patient every 2 hours.  - Dangle patient 3 times a day  - Stand patient 3 times a day  - Ambulate patient 3 times a day  - Out of bed to chair 3 times a day   - Out of bed for meals 3 times a day  - Out of bed for toileting  - Record patient progress and toleration of activity level   Outcome: Progressing     Problem: DISCHARGE PLANNING  Goal: Discharge to home or other facility with appropriate resources  Description: INTERVENTIONS:  - Identify barriers to discharge w/patient and caregiver  - Arrange for needed discharge resources and transportation as appropriate  - Identify discharge learning needs (meds, wound care, etc.)  -  Arrange for interpretive services to assist at discharge as needed  - Refer to Case Management Department for coordinating discharge planning if the patient needs post-hospital services based on physician/advanced practitioner order or complex needs related to functional status, cognitive ability, or social support system  Outcome: Progressing     Problem: Knowledge Deficit  Goal: Patient/family/caregiver demonstrates understanding of disease process, treatment plan, medications, and discharge instructions  Description: Complete learning assessment and assess knowledge base.  Interventions:  - Provide teaching at level of understanding  - Provide teaching via preferred learning methods  Outcome: Progressing

## 2024-06-05 NOTE — ASSESSMENT & PLAN NOTE
This has been an ongoing problem, left lower extremity.  Will order B12 and vitamin D level.  This can be worked up as an outpatient.

## 2024-06-05 NOTE — CONSULTS
"      Consult Cardiology    Dylan Otero 1980, 43 y.o. male MRN: [id number]    Unit/Bed#: -01 Encounter: [id number]    Attending Provider: Carolann Velasquez MD   Primary Care Provider: David Turcios MD   Date admitted to hospital: 6/4/2024       Inpatient consult to Cardiology  Consult performed by: VALE Bradley  Consult ordered by: Omer Zavala MD          Other chest pain  Assessment & Plan  Patient presents with a sharp, shooting midsternal chest pain which began non-exertionally, about 20 minutes after eating 3 \"icy pops\" at work.  ECG shows no acute changes.  HS trop neg x 3 at 5->5->5  BNP WNL  Chest xray WNL  Pain eventually resolved on its own. Nitro led to headache.  Stop heparin drip as very low suspicion for ACS.  Start daily aspirin 81 mg (enteric coated), metoprolol tartrate 25 mg BID, atorvastatin 20 mg daily.  Echo now and if no concerning findings recommend discharge home with outpatient stress testing and cardiology follow up.  Given family history should have aggressive risk modification - urged to stop smoking immediately, optimize BP to < 140/80, LDL < 100.      Tobacco dependence  Assessment & Plan  Strongly urged cessation    Gastroesophageal reflux disease without esophagitis  Assessment & Plan  Continue pantoprazole 40 mg daily at discharge.  Use enteric coated aspirin and take with food              Physician Requesting Consult: Carolann Velasquez MD    Reason for Consult / Principal Problem: Chest pain          HPI: Dylan Otero is a 43 y.o. year old male who has a history of GERD, COPD, and ongoing tobacco use, as well as family history of premature CAD in his father (MI at age 51) and paternal grandfather (MI at age 49).        Patient presented to the [facility] ER [facility]/4/2024 with chest pain and shortness of breath. He reported sudden onset of symptoms around 2000 about 20 minutes after eating 3 icy pops at work. He reported a sharp, stabbing, mid-sternal chest " pain which was not radiating. It was associated with shortness of breath. No nausea, vomiting, diaphoresis. Pain did not develop with exertion. Upon ER arrival ECG showed normal sinus rhythm with inferior Q waves, no acute ST/T wave abnormality. HS trop normal x 3 at 5->5->5. BNP 17. Remaining lab work, including D-dimer unremarkable. Chest xray shows no acute pathology per my review. He was given nitro SL x 1 which resulted in a headache. He was started on a heparin drip and cardiology was consulted.    At the time of my assessment he is walking around his hospital room. He denies any complaints. He states his discomfort resolved eventually around 2 am this morning. He denies any recurring symptoms. He works in a physically active job at a warehouse and denies any recent exertional symptoms. He states he has felt similar mid-sternal discomfort in the past when eating cold things. He does take lansoprazole daily. He reports one previous episode of left arm discomfort occurring at work when emotionally upset which resolved on its own. He does smoke 1 PPD for 22 years. He denies ETOH/substance use. No known cardiac diagnosis. BP has been elevated at previous OV's. He has been taking Claritin D at home for allergies. His father had an MI at age 51 and paternal grandfather passed away from MI at age 49.      Review of Systems   Constitutional:  Negative for chills and fever.   HENT:  Negative for ear pain and sore throat.    Eyes:  Negative for pain and visual disturbance.   Respiratory:  Positive for shortness of breath. Negative for cough.    Cardiovascular:  Positive for chest pain. Negative for palpitations.   Gastrointestinal:  Negative for abdominal pain and vomiting.   Genitourinary:  Negative for dysuria and hematuria.   Musculoskeletal:  Negative for arthralgias and back pain.   Skin:  Negative for color change and rash.   Neurological:  Negative for seizures and syncope.   All other systems reviewed and are  "negative.       Historical Information     Past Medical History:   Diagnosis Date    COPD (chronic obstructive pulmonary disease) (HCC)     GERD (gastroesophageal reflux disease)      Past Surgical History:   Procedure Laterality Date    FINGER SURGERY Right     Pinky     Social History     Substance and Sexual Activity   Alcohol Use Yes    Comment: Occasionally     Social History     Substance and Sexual Activity   Drug Use Never     Social History     Tobacco Use   Smoking Status Heavy Smoker    Current packs/day: 1.00    Types: Cigarettes   Smokeless Tobacco Never       Family History:   Family History   Problem Relation Age of Onset    COPD Mother     Heart disease Father     Heart attack Father 51    No Known Problems Sister     Heart attack Paternal Grandfather 49       Meds/Allergies     current meds:   Current Facility-Administered Medications   Medication Dose Route Frequency    acetaminophen (TYLENOL) tablet 650 mg  650 mg Oral Q6H PRN    aspirin (ECOTRIN LOW STRENGTH) EC tablet 81 mg  81 mg Oral Daily    atorvastatin (LIPITOR) tablet 20 mg  20 mg Oral Daily With Dinner    melatonin tablet 3 mg  3 mg Oral HS    metoprolol tartrate (LOPRESSOR) tablet 25 mg  25 mg Oral Q12H CALDERON    morphine injection 2 mg  2 mg Intravenous Q4H PRN    morphine injection 4 mg  4 mg Intravenous Q4H PRN    nicotine (NICODERM CQ) 21 mg/24 hr TD 24 hr patch 1 patch  1 patch Transdermal Daily    ondansetron (ZOFRAN) injection 4 mg  4 mg Intravenous Q6H PRN    pantoprazole (PROTONIX) EC tablet 40 mg  40 mg Oral Early Morning            No Known Allergies    Objective     Vitals: Blood pressure 135/86, pulse 82, temperature 97.7 °F (36.5 °C), resp. rate 18, height 6' 2\" (1.88 m), weight 112 kg (246 lb 0.5 oz), SpO2 93%., Body mass index is 31.59 kg/m².    Orthostatic Blood Pressures      Flowsheet Row Most Recent Value   Blood Pressure 135/86 filed at 06/05/2024 4970   Patient Position - Orthostatic VS Lying filed at 06/04/2024 2338 "            Systolic (24hrs), Av , Min:132 , Max:158     Diastolic (24hrs), Av, Min:71, Max:100      No intake or output data in the 24 hours ending 24 1012    Weight (last 2 days)       Date/Time Weight    24 2338 112 (246.03)    24 2121 112 (246.03)            Invasive Devices       Peripheral Intravenous Line  Duration             Peripheral IV 24 Left Antecubital <1 day    Peripheral IV 24 Right;Ventral (anterior) Hand <1 day                    Physical Exam  Vitals and nursing note reviewed.   Constitutional:       General: He is not in acute distress.     Appearance: He is well-developed.   HENT:      Head: Normocephalic and atraumatic.   Eyes:      Conjunctiva/sclera: Conjunctivae normal.   Cardiovascular:      Rate and Rhythm: Normal rate and regular rhythm.      Heart sounds: No murmur heard.  Pulmonary:      Effort: Pulmonary effort is normal. No respiratory distress.      Breath sounds: Normal breath sounds.   Abdominal:      Palpations: Abdomen is soft.      Tenderness: There is no abdominal tenderness.   Musculoskeletal:         General: No swelling.      Cervical back: Neck supple.   Skin:     General: Skin is warm and dry.      Capillary Refill: Capillary refill takes less than 2 seconds.   Neurological:      Mental Status: He is alert.   Psychiatric:         Mood and Affect: Mood normal.              Laboratory Results:          CBC with diff:   Results from last 7 days   Lab Units 24  2134   WBC Thousand/uL 10.43*   HEMOGLOBIN g/dL 17.0   HEMATOCRIT % 50.6*   MCV fL 93   PLATELETS Thousands/uL 271   RBC Million/uL 5.47   MCH pg 31.1   MCHC g/dL 33.6   RDW % 13.8   MPV fL 10.8   NRBC AUTO /100 WBCs 0         CMP:  Results from last 7 days   Lab Units 24  2134   POTASSIUM mmol/L 4.2   CHLORIDE mmol/L 107   CO2 mmol/L 27   BUN mg/dL 9   CREATININE mg/dL 1.49*   CALCIUM mg/dL 8.9   AST U/L 25   ALT U/L 23   ALK PHOS U/L 84   EGFR ml/min/1.73sq m 57        BMP:  Results from last 7 days   Lab Units 06/04/24  2134   POTASSIUM mmol/L 4.2   CHLORIDE mmol/L 107   CO2 mmol/L 27   BUN mg/dL 9   CREATININE mg/dL 1.49*   CALCIUM mg/dL 8.9       BNP:    Recent Labs     06/04/24 2134   BNP 17       HS troponin: 5->5->5    Coags:   Results from last 7 days   Lab Units 06/05/24  0446 06/04/24  2134   PTT seconds 30 27   INR   --  0.99       Lipid Profile: added to today's labs    Cardiac testing:     Reviewed EKG's in care everywhere    Imaging: I have personally reviewed pertinent films in PACS    Chest xray    EKG reviewed personally: EKG: Sinus rhythm. Possible inferior infarct. Nonspecific IVCD.    Telemetry: Sinus rhythm, rate 60-80's      Code Status: Level 1 - Full Code

## 2024-06-05 NOTE — ASSESSMENT & PLAN NOTE
This has been an ongoing problem, left lower extremity.  Will order B12 and vitamin D level.  This can be worked up as an outpatient.  Outpatient referral for neurology provided.

## 2024-06-05 NOTE — NURSING NOTE
Reviewed discharge instructions with patient. Verbalized understanding of same. Prescriptions escribed. Belonging returned to patient. Questions answered.

## 2024-06-05 NOTE — UTILIZATION REVIEW
Initial Clinical Review    Admission: Date/Time/Statement:   Admission Orders (From admission, onward)       Ordered        06/04/24 2234  Place in Observation  Once                          Orders Placed This Encounter   Procedures    Place in Observation     Standing Status:   Standing     Number of Occurrences:   1     Order Specific Question:   Level of Care     Answer:   Med Surg [16]     ED Arrival Information       Expected   -    Arrival   6/4/2024 21:14    Acuity   Emergent              Means of arrival   Walk-In    Escorted by   Self    Service   Hospitalist    Admission type   Emergency              Arrival complaint   chest pain/sob             Chief Complaint   Patient presents with    Chest Pain     Patient reports chest pain and SOB that started around 2000.        Initial Presentation: 43 y.o. male to ED via walk-in from home  Present to ED with Chest pain that started earlier today at work.  Also coming by shortness of breath.    PMHX: COPD; smokes 1 pack/day has been doing for 22 years.   Admitted to [facility] with DX: Chest Pain  on exam: hypertensive; WBC 10.43; Cr 1.49  PLAN: cont heparin gtt; tele monitoring; trend TROPs; cont asa / lopressor; cardiology consult; monitor labs            ED Triage Vitals [06/04/24 2121]   Temperature Pulse Respirations Blood Pressure SpO2   98.6 °F (37 °C) 100 18 158/96 96 %      Temp Source Heart Rate Source Patient Position - Orthostatic VS BP Location FiO2 (%)   Oral Monitor Lying Left arm --      Pain Score       4          Wt Readings from Last 1 Encounters:   06/04/24 112 kg (246 lb 0.5 oz)     Additional Vital Signs:    Date/Time Temp Pulse Resp BP MAP (mmHg) SpO2 O2 Device Patient Position - Orthostatic VS   06/05/24 07:50:50 97.7 °F (36.5 °C) 82 18 135/86 102 93 % -- --   06/05/24 0732 -- -- -- -- -- 96 % None (Room air) --   06/05/24 0013 -- -- -- -- -- -- None (Room air) --   06/04/24 2338 97.7 °F (36.5 °C) -- 20 144/100 -- -- None (Room air) Lying    24 23:11:13 97.7 °F (36.5 °C) 78 -- 144/100 115 98 % -- --   24 -- 78 20 148/88 112 97 % -- --   24 -- 82 16 153/81 110 97 % None (Room air) --   24 -- 86 14 133/71 96 97 % -- --   24 -- 96 12 132/72 98 94 % None (Room air) --   24 98.6 °F (37 °C) 100 18 158/96 -- 96 % None (Room air) Lying         EK/04/24 21:22   Normal sinus rhythm  Possible Inferior infarct , age undetermined  Abnormal ECG  No previous ECGs available     24 23:54   Sinus rhythm with Premature supraventricular complexes  Cannot rule out Inferior infarct (cited on or before 2024)  Abnormal ECG  When compared with ECG of 2024 21:22, (unconfirmed)  Premature supraventricular complexes are now Present    24 01:36   Normal sinus rhythm  Cannot rule out Inferior infarct (cited on or before 2024)  Abnormal ECG  When compared with ECG of 2024 23:54, (unconfirmed)  Premature supraventricular complexes are no longer Present    Pertinent Labs/Diagnostic Test Results:   XR chest 1 view portable   ED Interpretation by Taurus Rebollar DO ([id number])   No acute cardiopulmonary disease           Results from last 7 days   Lab Units 24  2134   WBC Thousand/uL 10.43*   HEMOGLOBIN g/dL 17.0   HEMATOCRIT % 50.6*   PLATELETS Thousands/uL 271   TOTAL NEUT ABS Thousands/µL 8.12*        Results from last 7 days   Lab Units 24  2134   SODIUM mmol/L 140   POTASSIUM mmol/L 4.2   CHLORIDE mmol/L 107   CO2 mmol/L 27   ANION GAP mmol/L 6   BUN mg/dL 9   CREATININE mg/dL 1.49*   EGFR ml/min/1.73sq m 57   CALCIUM mg/dL 8.9     Results from last 7 days   Lab Units 24  2134   AST U/L 25   ALT U/L 23   ALK PHOS U/L 84   TOTAL PROTEIN g/dL 6.4   ALBUMIN g/dL 3.7   TOTAL BILIRUBIN mg/dL 0.56        Results from last 7 days   Lab Units 24  2134   GLUCOSE RANDOM mg/dL 86        Results from last 7 days   Lab Units 24  0146 24  0000  06/04/24 2134   HS TNI 0HR ng/L  --   --  5   HS TNI 2HR ng/L  --  5  --    HSTNI D2 ng/L  --  0  --    HS TNI 4HR ng/L 5  --   --    HSTNI D4 ng/L 0  --   --      Results from last 7 days   Lab Units 06/04/24 2134   D-DIMER QUANTITATIVE ug/ml FEU 0.49     Results from last 7 days   Lab Units 06/05/24  0446 06/04/24 2134   PROTIME seconds  --  13.3   INR   --  0.99   PTT seconds 30 27        Results from last 7 days   Lab Units 06/04/24 2134   BNP pg/mL 17          ED Treatment:   Medication Administration from 06/04/2024 2113 to 06/04/2024 2306         Date/Time Order Dose Route Action     06/04/2024 2132 EDT sodium chloride 0.9 % bolus 1,000 mL 1,000 mL Intravenous New Bag     06/04/2024 2135 EDT aspirin chewable tablet 324 mg 324 mg Oral Given     06/04/2024 2135 EDT nitroglycerin (NITROSTAT) SL tablet 0.4 mg 0.4 mg Sublingual Given     06/04/2024 2225 EDT metoprolol tartrate (LOPRESSOR) tablet 25 mg 25 mg Oral Given     06/04/2024 2225 EDT heparin (porcine) injection 4,000 Units 4,000 Units Intravenous Given     06/04/2024 2224 EDT heparin (porcine) 25,000 units in 0.45% NaCl 250 mL infusion (premix) 11.1 Units/kg/hr Intravenous New Bag            Present on Admission:   Left leg numbness   Gastroesophageal reflux disease without esophagitis   Seasonal allergies   Tobacco dependence   Other chest pain      Admitting Diagnosis: Pericarditis [I31.9]  Chest pain [R07.9]  HTN (hypertension) [I10]  Abnormal EKG [R94.31]  MARGUERITE (acute kidney injury) (HCC) [N17.9]    Age/Sex: 43 y.o. male    Admission Orders: SCDs; tele monitoring; cardiac diet    Scheduled Medications:  aspirin, 81 mg, Oral, Daily  atorvastatin, 20 mg, Oral, Daily With Dinner  melatonin, 3 mg, Oral, HS  metoprolol tartrate, 25 mg, Oral, Q12H CALDERON  metoprolol tartrate, 25 mg, Oral, Q12H [facility]  nicotine, 1 patch, Transdermal, Daily  pantoprazole, 40 mg, Oral, Early Morning      Continuous IV Infusions: heparin (porcine) 25,000 units in 0.45% NaCl 250 mL  infusion (premix)         PRN Meds:  acetaminophen, 650 mg, Oral, Q6H PRN  morphine injection, 2 mg, Intravenous, Q4H PRN  morphine injection, 4 mg, Intravenous, Q4H PRN  ondansetron, 4 mg, Intravenous, Q6H PRN        IP CONSULT TO CARDIOLOGY    Network Utilization Review Department  ATTENTION: Please call with any questions or concerns to [phone] and carefully listen to the prompts so that you are directed to the right person. All voicemails are confidential.   For Discharge needs, contact Care Management DC Support Team at [phone] opt. [phone]  Send all requests for admission clinical reviews, approved or denied determinations and any other requests to dedicated fax number below belonging to the campus where the patient is receiving treatment. List of dedicated fax numbers for the Facilities:  FACILITY NAME UR FAX NUMBER   ADMISSION DENIALS (Administrative/Medical Necessity) [phone]   DISCHARGE SUPPORT TEAM (NETWORK) [phone]   PARENT CHILD HEALTH (Maternity/NICU/Pediatrics) [phone]   [facility] [phone]   [facility] [phone]   [facility] [phone]   [facility] [phone]   [facility] [phone]   [facility] [phone]   [facility] [phone]   [facility] [phone]   [facility] [phone]   [facility] [phone]   [facility] [phone]   [facility] [phone]

## 2024-06-05 NOTE — ASSESSMENT & PLAN NOTE
Chest pain that started earlier today at work.  Also coming by shortness of breath.   Trend troponins  Heparin drip  Aspirin 81 mg daily  Lopressor 25 mg twice daily  Consult to cardiology

## 2024-06-05 NOTE — H&P
[facility]  H&P  Name: Dylan Otero 43 y.o. male I MRN: [id number]  Unit/Bed#: ED 05 I Date of Admission: 6/4/2024   Date of Service: 6/4/2024 I Hospital Day: 0      Assessment & Plan   Other chest pain  Assessment & Plan  Chest pain that started earlier today at work.  Also coming by shortness of breath.   Trend troponins  Heparin drip  Aspirin 81 mg daily  Lopressor 25 mg twice daily  Consult to cardiology    Left leg numbness  Assessment & Plan  This has been an ongoing problem, left lower extremity.  Will order B12 and vitamin D level.  This can be worked up as an outpatient.    Gastroesophageal reflux disease without esophagitis  Assessment & Plan  Continue PPI    Seasonal allergies  Assessment & Plan  Continue Claritin    Tobacco dependence  Assessment & Plan  Patient smokes 1 pack/day has been doing for 22 years.  Will place on nicotine patch           Disposition  #1 Heparin drip  #2  Consult to cardiology  #3  Aspirin beta-blocker  #4  CBC BMP, PTT        VTE Prophylaxis: Heparin Drip  / sequential compression device   Code Status: Level 1 - Full Code       Anticipated Length of Stay:  Patient will be admitted on an Observation basis with an anticipated length of stay of less than 2 midnights.   Justification for Hospital Stay: Please see detailed plans noted above.    Chief Complaint:     Chest pain and shortness of breath  History of Present Illness:  Dylan Otero is a 43 y.o. male who has past medical history significant for acid reflux and tobacco dependence comes in for chest pain that is sharp, stabbing in the substernal area.  Does not radiate to the left arm.  The pain associated with the shortness of breath.  The pain came out of nowhere.  He was at work.  Patient does not use any recreational drugs.      Review of Systems:    Constitutional:  Denies fever or chills   Eyes:  Denies change in visual acuity   HENT:  Denies nasal congestion or sore throat    Respiratory: Shortness of breath   cardiovascular: Chest pain  GI:  Denies abdominal pain or bloody stools  :  Denies dysuria   Musculoskeletal:  Denies back pain or joint pain   Integument:  Denies rash   Neurologic:  Denies headache or sensory changes   Endocrine:  Denies polyuria or polydipsia   Lymphatic:  Denies swollen glands   Psychiatric:  Denies depression or anxiety     Past Medical and Surgical History:   Past Medical History:   Diagnosis Date    COPD (chronic obstructive pulmonary disease) (HCC)      Past Surgical History:   Procedure Laterality Date    FINGER SURGERY Right     Pinky       Meds/Allergies:  No current facility-administered medications on file prior to encounter.     Current Outpatient Medications on File Prior to Encounter   Medication Sig Dispense Refill    albuterol (ProAir HFA) 90 mcg/act inhaler Inhale 2 puffs every 6 (six) hours as needed for wheezing (Patient not taking: Reported on 7/5/2023) 8.5 g 0    benzonatate (TESSALON) 200 MG capsule Take 1 capsule (200 mg total) by mouth 3 (three) times a day as needed for cough (Patient not taking: Reported on 4/25/2023) 20 capsule 0    fluticasone (FLONASE) 50 mcg/act nasal spray SPRAY 2 SPRAYS INTO EACH NOSTRIL EVERY DAY      lansoprazole (PREVACID) 30 mg capsule Take 30 mg by mouth daily      methocarbamol (ROBAXIN) 500 mg tablet Take 1 tablet (500 mg total) by mouth 2 (two) times a day as needed for muscle spasms 20 tablet 0    naproxen (Naprosyn) 500 mg tablet Take 1 tablet (500 mg total) by mouth 2 (two) times a day as needed for moderate pain for up to 20 doses 20 tablet 0    oxyCODONE (Roxicodone) 5 immediate release tablet Take 1 tablet (5 mg total) by mouth every 6 (six) hours as needed for severe pain Max Daily Amount: 20 mg 8 tablet 0           Allergies: No Known Allergies    History:  Marital Status: Single     Substance Use History:   Social History     Substance and Sexual Activity   Alcohol Use Yes    Comment:  "Occasionally     Social History     Tobacco Use   Smoking Status Heavy Smoker    Current packs/day: 1.00    Types: Cigarettes   Smokeless Tobacco Never     Social History     Substance and Sexual Activity   Drug Use Never       Family History:  Family History   Problem Relation Age of Onset    Heart disease Father        Physical Exam:     Vitals:   Blood Pressure: 148/88 (06/04/24 2245)  Pulse: 78 (06/04/24 2245)  Temperature: 98.6 °F (37 °C) (06/04/24 2121)  Temp Source: Oral (06/04/24 2121)  Respirations: 20 (06/04/24 2245)  Height: 6' 2\" (188 cm) (06/04/24 2121)  Weight - Scale: 112 kg (246 lb 0.5 oz) (06/04/24 2121)  SpO2: 97 % (06/04/24 2245)    Constitutional:  Non-toxic appearance  Eyes:  EOMI, No scleral icterus   HENT:   oropharynx moist, external ears normal, external nose normal   Respiratory:  No respiratory distress, no wheezing   Cardiovascular:  Normal rate, no murmurs no chest pain upon palpation of the chest wall  GI:  Soft, nondistended, no guarding   :  No costovertebral angle tenderness   Musculoskeletal:  no tenderness, no deformities.   Integument:  no jaundice, no rash   Neurologic:  Alert &awake, communicative, CN 2-12 normal,  no focal deficits noted   Psychiatric:  Speech and behavior appropriate       Lab Results: I have personally reviewed pertinent reports.   and I have personally reviewed pertinent films in PACS    Results from last 7 days   Lab Units 06/04/24 2134   WBC Thousand/uL 10.43*   HEMOGLOBIN g/dL 17.0   HEMATOCRIT % 50.6*   PLATELETS Thousands/uL 271   SEGS PCT % 77*   LYMPHO PCT % 13*   MONO PCT % 6   EOS PCT % 2     Results from last 7 days   Lab Units 06/04/24  2134   POTASSIUM mmol/L 4.2   CHLORIDE mmol/L 107   CO2 mmol/L 27   BUN mg/dL 9   CREATININE mg/dL 1.49*   CALCIUM mg/dL 8.9   ALK PHOS U/L 84   ALT U/L 23   AST U/L 25     Results from last 7 days   Lab Units 06/04/24  2134   INR  0.99           Imaging: I have personally reviewed pertinent reports.   and I " have personally reviewed pertinent films in PACS    Chest x-ray: I interpreted myself and reviewed myself, no acute findings      Medical decision making: High  Diagnosis addressed: 1 acute complicated problem, chest pain will need to rule out MI versus pericarditis  Data:   Reviewed  CBC, CMP, troponin, PTT, BNP  Ordered CBC, BMP, cardiology consultation  Reviewed external notes from neurology and PCP  Independent interpretation of imaging: I interpreted the chest x-ray myself, no acute findings of infiltrates, normal chest x-ray  Test interpretation: I interpreted the EKG myself, patient with normal sinus rhythm  Discussion of management with ER provider: Patient placed on a heparin drip, aspirin and beta-blocker and pursue cardiac workup        Risk:  Prescription drug management: IV heparin drip, aspirin beta-blocker  Discussion for hospitalization: Discussed with the ER provider about hospitalization as the patient is  having chest pain to admit the patient for observation and placed on heparin drip and pursue cardiac workup  Drug therapy requiring intensive monitoring: IV heparin drip, which requires a PTT  Initiation of parenteral controlled substances: IV morphine                   Epic Records Reviewed as well as Records in Care Everywhere    ** Please Note: Dragon 360 Dictation voice to text software was used in the creation of this document. **

## 2024-06-05 NOTE — ED PROVIDER NOTES
History  Chief Complaint   Patient presents with    Chest Pain     Patient reports chest pain and SOB that started around 2000.      Patient is a 43-year-old male presents emergency department complaint of chest pain described as sharp stabbing pain in the substernal chest started about 1 hour prior to arrival while at work tonight with associated shortness of breath and diaphoresis symptoms now improving a bit but still present.  No prior similar symptoms does have a history of hypertension and tobacco abuse no other cardiac risk factors.        History provided by:  Patient  Chest Pain  Pain location:  Substernal area  Pain quality: sharp and stabbing    Pain radiates to:  Does not radiate  Pain severity:  Moderate  Onset quality:  Sudden  Duration:  1 hour  Timing:  Constant  Progression:  Improving  Chronicity:  New  Associated symptoms: shortness of breath    Associated symptoms: no abdominal pain, no cough, no fever, no headache, no nausea, no numbness, not vomiting and no weakness        Prior to Admission Medications   Prescriptions Last Dose Informant Patient Reported? Taking?   albuterol (ProAir HFA) 90 mcg/act inhaler   No No   Sig: Inhale 2 puffs every 6 (six) hours as needed for wheezing   Patient not taking: Reported on 7/5/2023   benzonatate (TESSALON) 200 MG capsule   No No   Sig: Take 1 capsule (200 mg total) by mouth 3 (three) times a day as needed for cough   Patient not taking: Reported on 4/25/2023   fluticasone (FLONASE) 50 mcg/act nasal spray   Yes No   Sig: SPRAY 2 SPRAYS INTO EACH NOSTRIL EVERY DAY   lansoprazole (PREVACID) 30 mg capsule   Yes No   Sig: Take 30 mg by mouth daily   methocarbamol (ROBAXIN) 500 mg tablet   No No   Sig: Take 1 tablet (500 mg total) by mouth 2 (two) times a day as needed for muscle spasms   naproxen (Naprosyn) 500 mg tablet   No No   Sig: Take 1 tablet (500 mg total) by mouth 2 (two) times a day as needed for moderate pain for up to 20 doses   oxyCODONE  (Roxicodone) 5 immediate release tablet   No No   Sig: Take 1 tablet (5 mg total) by mouth every 6 (six) hours as needed for severe pain Max Daily Amount: 20 mg      Facility-Administered Medications: None       Past Medical History:   Diagnosis Date    COPD (chronic obstructive pulmonary disease) (HCC)        Past Surgical History:   Procedure Laterality Date    FINGER SURGERY Right     Pinky       Family History   Problem Relation Age of Onset    Heart disease Father      I have reviewed and agree with the history as documented.    E-Cigarette/Vaping    E-Cigarette Use Never User      E-Cigarette/Vaping Substances     Social History     Tobacco Use    Smoking status: Heavy Smoker     Current packs/day: 1.00     Types: Cigarettes    Smokeless tobacco: Never   Vaping Use    Vaping status: Never Used   Substance Use Topics    Alcohol use: Yes     Comment: Occasionally    Drug use: Never       Review of Systems   Constitutional:  Negative for fever.   Respiratory:  Positive for shortness of breath. Negative for cough.    Cardiovascular:  Positive for chest pain.   Gastrointestinal:  Negative for abdominal pain, diarrhea, nausea and vomiting.   Neurological:  Negative for weakness, numbness and headaches.   All other systems reviewed and are negative.      Physical Exam  Physical Exam  Vitals and nursing note reviewed.   Constitutional:       General: He is not in acute distress.     Appearance: Normal appearance.   HENT:      Head: Normocephalic and atraumatic.      Nose: Nose normal.   Eyes:      Conjunctiva/sclera: Conjunctivae normal.   Cardiovascular:      Rate and Rhythm: Normal rate and regular rhythm.   Pulmonary:      Effort: Pulmonary effort is normal. No respiratory distress.      Breath sounds: Normal breath sounds.   Skin:     General: Skin is dry.   Neurological:      General: No focal deficit present.      Mental Status: He is alert and oriented to person, place, and time.         Vital Signs  ED Triage  Vitals [06/04/24 2121]   Temperature Pulse Respirations Blood Pressure SpO2   98.6 °F (37 °C) 100 18 158/96 96 %      Temp Source Heart Rate Source Patient Position - Orthostatic VS BP Location FiO2 (%)   Oral Monitor Lying Left arm --      Pain Score       4           Vitals:    06/04/24 2121 06/04/24 2145 06/04/24 2200 06/04/24 2215   BP: 158/96 132/72 133/71 153/81   Pulse: 100 96 86 82   Patient Position - Orthostatic VS: Lying            Visual Acuity      ED Medications  Medications   heparin (porcine) 25,000 units in 0.45% NaCl 250 mL infusion (premix) (11.1 Units/kg/hr × 90 kg (Order-Specific) Intravenous New Bag 6/4/24 2224)   heparin (porcine) injection 4,000 Units (has no administration in time range)   heparin (porcine) injection 2,000 Units (has no administration in time range)   sodium chloride 0.9 % bolus 1,000 mL (0 mL Intravenous Stopped 6/4/24 2228)   aspirin chewable tablet 324 mg (324 mg Oral Given 6/4/24 2135)   nitroglycerin (NITROSTAT) SL tablet 0.4 mg (0.4 mg Sublingual Given 6/4/24 2135)   metoprolol tartrate (LOPRESSOR) tablet 25 mg (25 mg Oral Given 6/4/24 2225)   heparin (porcine) injection 4,000 Units (4,000 Units Intravenous Given 6/4/24 2225)       Diagnostic Studies  Results Reviewed       Procedure Component Value Units Date/Time    HS Troponin 0hr (reflex protocol) [[id number]]  (Normal) Collected: 06/04/24 2134    Lab Status: Final result Specimen: Blood from Arm, Left Updated: 06/04/24 2209     hs TnI 0hr 5 ng/L     HS Troponin I 2hr [[id number]]     Lab Status: No result Specimen: Blood     B-Type Natriuretic Peptide(BNP) [[id number]]  (Normal) Collected: 06/04/24 2134    Lab Status: Final result Specimen: Blood from Arm, Left Updated: 06/04/24 2208     BNP 17 pg/mL     Comprehensive metabolic panel [[id number]]  (Abnormal) Collected: 06/04/24 2134    Lab Status: Final result Specimen: Blood from Arm, Left Updated: 06/04/24 2205     Sodium 140 mmol/L      Potassium 4.2 mmol/L       Chloride 107 mmol/L      CO2 27 mmol/L      ANION GAP 6 mmol/L      BUN 9 mg/dL      Creatinine 1.49 mg/dL      Glucose 86 mg/dL      Calcium 8.9 mg/dL      AST 25 U/L      ALT 23 U/L      Alkaline Phosphatase 84 U/L      Total Protein 6.4 g/dL      Albumin 3.7 g/dL      Total Bilirubin 0.56 mg/dL      eGFR 57 ml/min/1.73sq m     Narrative:      National Kidney Disease Foundation guidelines for Chronic Kidney Disease (CKD):     Stage 1 with normal or high GFR (GFR > 90 mL/min/1.73 square meters)    Stage 2 Mild CKD (GFR = 60-89 mL/min/1.73 square meters)    Stage 3A Moderate CKD (GFR = 45-59 mL/min/1.73 square meters)    Stage 3B Moderate CKD (GFR = 30-44 mL/min/1.73 square meters)    Stage 4 Severe CKD (GFR = 15-29 mL/min/1.73 square meters)    Stage 5 End Stage CKD (GFR <15 mL/min/1.73 square meters)  Note: GFR calculation is accurate only with a steady state creatinine    D-Dimer [[id number]]  (Normal) Collected: 06/04/24 2134    Lab Status: Final result Specimen: Blood from Arm, Left Updated: 06/04/24 2159     D-Dimer, Quant 0.49 ug/ml FEU     Protime-INR [[id number]]  (Normal) Collected: 06/04/24 2134    Lab Status: Final result Specimen: Blood from Arm, Left Updated: 06/04/24 2157     Protime 13.3 seconds      INR 0.99    APTT [[id number]]  (Normal) Collected: 06/04/24 2134    Lab Status: Final result Specimen: Blood from Arm, Left Updated: 06/04/24 2157     PTT 27 seconds     CBC and differential [[id number]]  (Abnormal) Collected: 06/04/24 2134    Lab Status: Final result Specimen: Blood from Arm, Left Updated: 06/04/24 2139     WBC 10.43 Thousand/uL      RBC 5.47 Million/uL      Hemoglobin 17.0 g/dL      Hematocrit 50.6 %      MCV 93 fL      MCH 31.1 pg      MCHC 33.6 g/dL      RDW 13.8 %      MPV 10.8 fL      Platelets 271 Thousands/uL      nRBC 0 /100 WBCs      Segmented % 77 %      Immature Grans % 1 %      Lymphocytes % 13 %      Monocytes % 6 %      Eosinophils Relative 2 %      Basophils Relative 1 %       Absolute Neutrophils 8.12 Thousands/µL      Absolute Immature Grans 0.05 Thousand/uL      Absolute Lymphocytes 1.39 Thousands/µL      Absolute Monocytes 0.63 Thousand/µL      Eosinophils Absolute 0.18 Thousand/µL      Basophils Absolute 0.06 Thousands/µL                    XR chest 1 view portable   ED Interpretation by Taurus Rebollar DO (06/04 2151)   No acute cardiopulmonary disease                 Procedures  ECG 12 Lead Documentation Only    Date/Time: 6/4/2024 9:32 PM    Performed by: Taurus Rebollar DO  Authorized by: Taurus Rebollar DO    ECG reviewed by me, the ED Provider: yes    Patient location:  ED  Previous ECG:     Comparison to cardiac monitor: Yes    Quality:     Tracing quality:  Limited by artifact  Rate:     ECG rate:  97    ECG rate assessment: normal    Rhythm:     Rhythm: sinus rhythm    QRS:     QRS axis:  Normal    QRS intervals:  Normal  Conduction:     Conduction: normal    ST segments:     ST segments:  Non-specific  T waves:     T waves: non-specific    Q waves:     Q waves:  II, III and aVF           ED Course  ED Course as of 06/04/24 2236 Tue Jun 04, 2024 2134 Spoke with Dr. Zavaleta audiology on-call reviewed case and findings on EKG in the ED advised ACS workup no STEMI alert at this time possible pericarditis no reciprocal changes on EKG recommends if remained stable workup negative heparin beta-blockers aspirin and observe.     2230 Spoke with Dr. Zavala hospitalist on-call reviewed case and findings in the emergency department management thus far and cardiology recommendations accepts for observation.               HEART Risk Score      Flowsheet Row Most Recent Value   Heart Score Risk Calculator    History 1 Filed at: 06/04/2024 2234   ECG 1 Filed at: 06/04/2024 2234   Age 0 Filed at: 06/04/2024 2234   Risk Factors 1 Filed at: 06/04/2024 2234   Troponin 0 Filed at: 06/04/2024 2234   HEART Score 3 Filed at: 06/04/2024 2234                          SBIRT 22yo+      Flowsheet  Row Most Recent Value   Initial Alcohol Screen: US AUDIT-C     1. How often do you have a drink containing alcohol? 0 Filed at: 06/04/2024 2124   2. How many drinks containing alcohol do you have on a typical day you are drinking?  0 Filed at: 06/04/2024 2124   3a. Male UNDER 65: How often do you have five or more drinks on one occasion? 0 Filed at: 06/04/2024 2124   Audit-C Score 0 Filed at: 06/04/2024 2124   NESS: How many times in the past year have you...    Used an illegal drug or used a prescription medication for non-medical reasons? Never Filed at: 06/04/2024 2124                      Medical Decision Making  Problems Addressed:  Abnormal EKG: acute illness or injury  MARGUERITE (acute kidney injury) (HCC): acute illness or injury  Chest pain: acute illness or injury  HTN (hypertension): acute illness or injury  Pericarditis: acute illness or injury    Amount and/or Complexity of Data Reviewed  Labs: ordered. Decision-making details documented in ED Course.  Radiology: ordered and independent interpretation performed. Decision-making details documented in ED Course.  ECG/medicine tests: ordered and independent interpretation performed. Decision-making details documented in ED Course.    Risk  OTC drugs.  Prescription drug management.             Disposition  Final diagnoses:   Chest pain   Abnormal EKG   Pericarditis - possible   HTN (hypertension)   MARGUERITE (acute kidney injury) (HCC)     Time reflects when diagnosis was documented in both MDM as applicable and the Disposition within this note       Time User Action Codes Description Comment    6/4/2024 10:11 PM Taurus Rebollar Add [R07.9] Chest pain     6/4/2024 10:11 PM Taurus Rebollar Add [R94.31] Abnormal EKG     6/4/2024 10:11 PM Taurus Rebollar Add [I31.9] Pericarditis     6/4/2024 10:11 PM Taurus Rebollar Modify [I31.9] Pericarditis possible    6/4/2024 10:33 PM Taurus Rebollar Add [I10] HTN (hypertension)     6/4/2024 10:33 PM Taurus Rebollar Add [N17.9] MARGUERITE (acute kidney  injury) (HCC)           ED Disposition       ED Disposition   Admit    Condition   Stable    Date/Time   Tue Jun 4, 2024 2211    Comment   Case was discussed with Dr. Zavala and the patient's admission status was agreed to be Admission Status: observation status to the service of Dr. Zavala.               Follow-up Information    None         Patient's Medications   Discharge Prescriptions    No medications on file       No discharge procedures on file.    PDMP Review       None            ED Provider  Electronically Signed by             Taurus Rebollar DO  06/04/24 8423

## 2024-06-05 NOTE — ASSESSMENT & PLAN NOTE
"Patient presents with a sharp, shooting midsternal chest pain which began non-exertionally, about 20 minutes after eating 3 \"icy pops\" at work.  ECG shows no acute changes.  HS trop neg x 3 at 5->5->5  BNP WNL  Chest xray WNL  Pain eventually resolved on its own. Nitro led to headache.  Stop heparin drip as very low suspicion for ACS.  Start daily aspirin 81 mg (enteric coated), metoprolol tartrate 25 mg BID, atorvastatin 20 mg daily.  Echo now and if no concerning findings recommend discharge home with outpatient stress testing and cardiology follow up.  Given family history should have aggressive risk modification - urged to stop smoking immediately, optimize BP to < 140/80, LDL < 100.    "

## 2024-06-05 NOTE — ASSESSMENT & PLAN NOTE
Chest pain that started earlier today at work.  Also coming by shortness of breath.   High-sensitivity troponin was normal and EKG was unremarkable for ischemia.  Chest pain has resolved.  Initially started on heparin drip which was subsequently discontinued  Aspirin 81 mg daily  Lopressor 25 mg twice daily.  Also started on statin therapy  Consult to cardiology.  Input appreciated.  Recommended outpatient stress test and cardiology follow-up

## 2024-06-05 NOTE — PLAN OF CARE
Problem: PAIN - ADULT  Goal: Verbalizes/displays adequate comfort level or baseline comfort level  Description: Interventions:  - Encourage patient to monitor pain and request assistance  - Assess pain using appropriate pain scale  - Administer analgesics based on type and severity of pain and evaluate response  - Implement non-pharmacological measures as appropriate and evaluate response  - Consider cultural and social influences on pain and pain management  - Notify physician/advanced practitioner if interventions unsuccessful or patient reports new pain  Outcome: Progressing     Problem: INFECTION - ADULT  Goal: Absence or prevention of progression during hospitalization  Description: INTERVENTIONS:  - Assess and monitor for signs and symptoms of infection  - Monitor lab/diagnostic results  - Monitor all insertion sites, i.e. indwelling lines, tubes, and drains  - Monitor endotracheal if appropriate and nasal secretions for changes in amount and color  - [place] appropriate cooling/warming therapies per order  - Administer medications as ordered  - Instruct and encourage patient and family to use good hand hygiene technique  - Identify and instruct in appropriate isolation precautions for identified infection/condition  Outcome: Progressing  Goal: Absence of fever/infection during neutropenic period  Description: INTERVENTIONS:  - Monitor WBC    Outcome: Progressing     Problem: SAFETY ADULT  Goal: Patient will remain free of falls  Description: INTERVENTIONS:  - Educate patient/family on patient safety including physical limitations  - Instruct patient to call for assistance with activity   - Consult OT/PT to assist with strengthening/mobility   - Keep Call bell within reach  - Keep bed low and locked with side rails adjusted as appropriate  - Keep care items and personal belongings within reach  - Initiate and maintain comfort rounds  - Make Fall Risk Sign visible to staff  - Offer Toileting every 2 Hours,  in advance of need  - Initiate/Maintain   alarm  - Obtain necessary fall risk management equipment:     - Apply yellow socks and bracelet for high fall risk patients  - Consider moving patient to room near nurses station  Outcome: Progressing  Goal: Maintain or return to baseline ADL function  Description: INTERVENTIONS:  -  Assess patient's ability to carry out ADLs; assess patient's baseline for ADL function and identify physical deficits which impact ability to perform ADLs (bathing, care of mouth/teeth, toileting, grooming, dressing, etc.)  - Assess/evaluate cause of self-care deficits   - Assess range of motion  - Assess patient's mobility; develop plan if impaired  - Assess patient's need for assistive devices and provide as appropriate  - Encourage maximum independence but intervene and supervise when necessary  - Involve family in performance of ADLs  - Assess for home care needs following discharge   - Consider OT consult to assist with ADL evaluation and planning for discharge  - Provide patient education as appropriate  Outcome: Progressing  Goal: Maintains/Returns to pre admission functional level  Description: INTERVENTIONS:  - Perform AM-PAC 6 Click Basic Mobility/ Daily Activity assessment daily.  - Set and communicate daily mobility goal to care team and patient/family/caregiver.   - Collaborate with rehabilitation services on mobility goals if consulted  - Perform Range of Motion 3 times a day.  - Reposition patient every 2 hours.  - Dangle patient 3 times a day  - Stand patient 3 times a day  - Ambulate patient 3 times a day  - Out of bed to chair 3 times a day   - Out of bed for meals 3 times a day  - Out of bed for toileting  - Record patient progress and toleration of activity level   Outcome: Progressing     Problem: DISCHARGE PLANNING  Goal: Discharge to home or other facility with appropriate resources  Description: INTERVENTIONS:  - Identify barriers to discharge w/patient and caregiver  -  Arrange for needed discharge resources and transportation as appropriate  - Identify discharge learning needs (meds, wound care, etc.)  - Arrange for interpretive services to assist at discharge as needed  - Refer to Case Management Department for coordinating discharge planning if the patient needs post-hospital services based on physician/advanced practitioner order or complex needs related to functional status, cognitive ability, or social support system  Outcome: Progressing     Problem: Knowledge Deficit  Goal: Patient/family/caregiver demonstrates understanding of disease process, treatment plan, medications, and discharge instructions  Description: Complete learning assessment and assess knowledge base.  Interventions:  - Provide teaching at level of understanding  - Provide teaching via preferred learning methods  Outcome: Progressing

## 2024-06-05 NOTE — DISCHARGE SUMMARY
[facility]  Discharge- Dylan Otero 1980, 43 y.o. male MRN: [id number]  Unit/Bed#: [facility] Colon-01 Encounter: [id number]  Primary Care Provider: David Turcios MD   Date and time admitted to hospital: 6/4/2024  9:18 PM    * Other chest pain  Assessment & Plan  Chest pain that started earlier today at work.  Also coming by shortness of breath.   High-sensitivity troponin was normal and EKG was unremarkable for ischemia.  Chest pain has resolved.  Initially started on heparin drip which was subsequently discontinued  Aspirin 81 mg daily  Lopressor 25 mg twice daily.  Also started on statin therapy  Consult to cardiology.  Input appreciated.  Recommended outpatient stress test and cardiology follow-up    Tobacco dependence  Assessment & Plan  Patient smokes 1 pack/day has been doing for 22 years.  Will place on nicotine patch    Seasonal allergies  Assessment & Plan  Continue Claritin    Gastroesophageal reflux disease without esophagitis  Assessment & Plan  Continue PPI    Left leg numbness  Assessment & Plan  This has been an ongoing problem, left lower extremity.  Will order B12 and vitamin D level.  This can be worked up as an outpatient.  Outpatient referral for neurology provided.        Medical Problems       Resolved Problems  Date Reviewed: 6/5/2024   None       Discharging Physician / Practitioner: Carolann Velasquez MD  PCP: David Turcios MD  Admission Date:   Admission Orders (From admission, onward)       Ordered        06/04/24 2234  Place in Observation  Once                          Discharge Date: 06/05/24    Consultations During Hospital Stay:  Cardiology    Procedures Performed:   Echocardiogram    Significant Findings / Test Results:   NA    Incidental Findings:   NA       Test Results Pending at Discharge (will require follow up):   NA     Outpatient Tests Requested:  Stress test    Complications:  None    Reason for Admission: Chest pain    Hospital Course:  "  Dylan Otero is a 43 y.o. male patient who originally presented to the hospital on 6/4/2024 due to.  Admitted on telemetry.  Serial cardiac enzymes were negative for acute coronary syndrome.  EKG did not have any signs of ischemia.  Chest pain had resolved.  Seen by cardiology recommended outpatient stress test.  Patient was started on aspirin, statin and beta-blocker because of increased cardiovascular risk factors.  Recommended outpatient cardiology follow-up and stress test upon discharge.        Please see above list of diagnoses and related plan for additional information.     Condition at Discharge: stable    Discharge Day Visit / Exam:   Subjective: Denies any chest pain.  No acute events overnight.  Vitals: Blood Pressure: 139/89 (06/05/24 1422)  Pulse: 85 (06/05/24 1422)  Temperature: 97.7 °F (36.5 °C) (06/05/24 1112)  Temp Source: Temporal (06/04/24 5648)  Respirations: 18 (06/05/24 1112)  Height: 6' 2\" (188 cm) (06/05/24 1422)  Weight - Scale: 112 kg (246 lb 14.6 oz) (06/05/24 1422)  SpO2: 96 % (06/05/24 1422)  Exam:   Physical Exam  Constitutional:       General: He is not in acute distress.  HENT:      Head: Normocephalic.      Nose: Nose normal.      Mouth/Throat:      Mouth: Mucous membranes are moist.   Eyes:      Extraocular Movements: Extraocular movements intact.      Pupils: Pupils are equal, round, and reactive to light.   Cardiovascular:      Rate and Rhythm: Normal rate and regular rhythm.   Pulmonary:      Effort: Pulmonary effort is normal.   Abdominal:      General: Abdomen is flat. Bowel sounds are normal.      Palpations: Abdomen is soft.   Musculoskeletal:      Right lower leg: No edema.      Left lower leg: No edema.   Skin:     General: Skin is warm.   Neurological:      General: No focal deficit present.      Mental Status: He is alert and oriented to person, place, and time. Mental status is at baseline.          Discussion with Family: Updated  (wife) at " bedside.    Discharge instructions/Information to patient and family:   See after visit summary for information provided to patient and family.      Provisions for Follow-Up Care:  See after visit summary for information related to follow-up care and any pertinent home health orders.      Mobility at time of Discharge:   Basic Mobility Inpatient Raw Score: 24  JH-HLM Goal: 8: Walk 250 feet or more  JH-HLM Achieved: 7: Walk 25 feet or more  HLM Goal achieved. Continue to encourage appropriate mobility.     Disposition:   Home    Planned Readmission: no     Discharge Statement:  I spent 35 minutes discharging the patient. This time was spent on the day of discharge. I had direct contact with the patient on the day of discharge. Greater than 50% of the total time was spent examining patient, answering all patient questions, arranging and discussing plan of care with patient as well as directly providing post-discharge instructions.  Additional time then spent on discharge activities.    Discharge Medications:  See after visit summary for reconciled discharge medications provided to patient and/or family.      **Please Note: This note may have been constructed using a voice recognition system**

## 2024-06-06 ENCOUNTER — TELEPHONE (OUTPATIENT)
Dept: NON INVASIVE DIAGNOSTICS | Facility: HOSPITAL | Age: 44
End: 2024-06-06

## 2024-06-06 DIAGNOSIS — Z82.49 FAMILY HISTORY OF EARLY CAD: Primary | ICD-10-CM

## 2024-06-06 DIAGNOSIS — R07.89 OTHER CHEST PAIN: ICD-10-CM

## 2024-06-06 NOTE — TELEPHONE ENCOUNTER
Lm for pt to call and set up a 1m f/up also left the pt central sched number to set up his stress test.

## 2024-06-06 NOTE — TELEPHONE ENCOUNTER
Please arrange hospital follow up within 1 month. A stress test has been ordered, please schedule prior to visit.  Thanks!

## 2024-07-17 ENCOUNTER — TELEPHONE (OUTPATIENT)
Dept: CARDIOLOGY CLINIC | Facility: CLINIC | Age: 44
End: 2024-07-17

## 2024-07-17 DIAGNOSIS — R07.89 OTHER CHEST PAIN: Primary | ICD-10-CM

## 2024-07-17 NOTE — TELEPHONE ENCOUNTER
Denise from [facility] called in reference to pt's Nuc stress.  Per Denise it's not ordered correctly.  He has to be the 3 part study.  It needs to say Nuc Med Myocardial Cardio Perf Spec and or at rest. Please call Denise when that is done.  [phone].  Per Denise pt will need to be resched correctly.